# Patient Record
Sex: FEMALE | Race: WHITE | URBAN - METROPOLITAN AREA
[De-identification: names, ages, dates, MRNs, and addresses within clinical notes are randomized per-mention and may not be internally consistent; named-entity substitution may affect disease eponyms.]

---

## 2022-10-26 ENCOUNTER — OFFICE VISIT (OUTPATIENT)
Dept: ORTHOPEDIC SURGERY | Age: 57
End: 2022-10-26
Payer: COMMERCIAL

## 2022-10-26 VITALS — WEIGHT: 259 LBS | BODY MASS INDEX: 45.89 KG/M2 | HEIGHT: 63 IN

## 2022-10-26 DIAGNOSIS — S46.911A STRAIN OF RIGHT SHOULDER, INITIAL ENCOUNTER: Primary | ICD-10-CM

## 2022-10-26 DIAGNOSIS — S46.011A TRAUMATIC INCOMPLETE TEAR OF RIGHT ROTATOR CUFF, INITIAL ENCOUNTER: ICD-10-CM

## 2022-10-26 PROCEDURE — 99203 OFFICE O/P NEW LOW 30 MIN: CPT | Performed by: ORTHOPAEDIC SURGERY

## 2022-10-26 RX ORDER — PANTOPRAZOLE SODIUM 40 MG/1
TABLET, DELAYED RELEASE ORAL
COMMUNITY
Start: 2022-08-18

## 2022-10-26 RX ORDER — LEVOCETIRIZINE DIHYDROCHLORIDE 5 MG/1
TABLET, FILM COATED ORAL
COMMUNITY
Start: 2022-08-22

## 2022-10-26 RX ORDER — ACETAMINOPHEN 325 MG/1
650 TABLET ORAL EVERY 6 HOURS PRN
COMMUNITY
Start: 2022-06-15

## 2022-10-26 NOTE — LETTER
Physical Therapy Rehabilitation Referral    Patient Name:  Hardik Holm      YOB: 1965    Diagnosis:    1. Strain of right shoulder, initial encounter    2. Traumatic incomplete tear of right rotator cuff, initial encounter        Precautions:     [x] Evaluate and Treat    Post Op Instructions:  [] Continuous passive motion (CPM)  [] Elbow ROM  [x] Exercise in plane of scapula  []  Strengthening     [x] Pulley and instruction   [x] Home exercise program (copy to patient)   [] Sling when arm at risk  [] Sling or brace at all times   [] AAROM: Forward elevation to  140            [] AAROM: External rotation  To  40    [] Isometric external rotator strengthening [] AAROM: internal rotation: up the back  [x] Isometric abductor strengthening  [] AAROM: Internal abduction   [] Isometric internal rotator strengthening [] AAROM: cross-body adduction             Stretching:     Strengthening:  [x] Four quadrant (FE, ER, IR, CBA)  [x] Rotator cuff (ER, IR, Abd)  [x] Forward Elevation    [] External Rotators     [x] External Rotation    [] Internal Rotators  [x] Internal Rotation: up/back   [] Abductors     [x] Internal Rotation: supine in abduction  [x] Sleeper Stretch    [] Flexors  [x] Cross-body abduction    [] Extensors  [x] Pendulum (FE, Abd/Add, cw/ccw)  [x] Scapular Stabilizers   [x] Wall-walking (FE, Abd)        [x] Shoulder shrugs     [x] Table slides (FE)                [x] Rhomboid pinch  [] Elbow (flex, ext, pron, sup)        [x] Lat.  Pull downs     [] Medial epicondylitis program       [x] Forward punch   [] Lateral epicondylitis program       [x] Internal rotators     [] Progressive resistive exercises  [] Bench Press        [] Bench press plus  Activities:     [] Lateral pull-downs  [] Rowing     [x] Progressive two-hand supine press  [] Stepper/Exercise bike   [x] Biceps: curls/supination  [] Swimming  [] Water exercises    Modalities:     Return to Sport:  [x] Of Choice      [] Plyometrics  [] Ultrasound     [] Rhythmic stabilization  [] Iontophoresis    [] Core strengthening   [] Moist heat     [] Sports specific program:   [] Massage         [x] Cryotherapy      [] Electrical stimulation     [] Paraffin  [] Whirlpool  [] TENS    [x] Home exercise program (copy to patient). Perform exercises for:   15     minutes    3      times/day  [x] Supervised physical therapy  Frequency: []  1x week  [x] 2x week  [] 3x week  [] Other:   Duration: [] 2 weeks   [] 4 weeks  [x] 6 weeks  [] Other:     Additional Instructions:     Josafat Yuan MD, PhD

## 2022-10-26 NOTE — PROGRESS NOTES
[facility]  History and Physical  Shoulder Pain    Date:  10/27/2022    Name:  Kellee Gayle  Address:  [facility] [address] [address]    :  1965      Age:   56 y.o.    SSN:  [id number]      Medical Record Number:  [id number]    Reason for Visit:    Shoulder Pain [facility] NP RIGHT SHOULDER)      HPI:   Kellee Gayle is a 56 y.o. female who presents to our office today complaining of  right shoulder pain. She states she never had any problems with her right shoulder until she had a work related on 2022. She works for [employer]. 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 Since then she began having pain over the anterior shoulder. She tried to ice her shoulder that day but continue to have persistent symptoms and was more pain when driving home that evening. She went on to see an orthopedic at [facility] who was able to get radiographs and an MRI of her right shoulder. The right shoulder showed a partial supraspinatus tear. She has not had a chance to do any formal physical therapy. She has an allergy to ibuprofen and does not feel comfortable taking any oral NSAIDs. Pain over the anterior shoulder and down the posterior shoulder in the periscapular region. Her  with [employer] had recommended that she get a second opinion with Dr. John Aguilera. Of note the patient is [place],  and has 3 adult children. She is fluent in [place]. Pain Assessment  Location of Pain: Shoulder  Location Modifiers: Right  Severity of Pain: 5  Quality of Pain: Aching, Dull, Sharp, Throbbing  Duration of Pain: Persistent  Frequency of Pain: Constant  Aggravating Factors:  Other (Comment), Exercise, Straightening, Stretching  Limiting Behavior: Yes  Relieving Factors: Rest, Other (Comment), Nsaids  Result of Injury: Yes  Work-Related Injury: Yes  Are there other pain locations you wish to document?: No    Review of Systems:  A 14 point review of systems available in the scanned medical record as documented by the patient. The review is negative with the exception of those things mentioned in the History of Present Illness and Past Medical History. Past History:  No past medical history on file. No past surgical history on file. Current Outpatient Medications on File Prior to Visit   Medication Sig Dispense Refill    budesonide (PULMICORT FLEXHALER) 180 MCG/ACT AEPB inhaler Inhale 1 puff into the lungs 2 times daily      acetaminophen (TYLENOL) 325 MG tablet Take 650 mg by mouth every 6 hours as needed      levocetirizine (XYZAL) 5 MG tablet TAKE 2 TABLETS BY MOUTH AT BEDTIME      pantoprazole (PROTONIX) 40 MG tablet TAKE 1 TABLET BY MOUTH EVERY DAY       No current facility-administered medications on file prior to visit. Social History     Socioeconomic History    Marital status:      Spouse name: Not on file    Number of children: Not on file    Years of education: Not on file    Highest education level: Not on file   Occupational History    Not on file   Tobacco Use    Smoking status: Never    Smokeless tobacco: Never   Substance and Sexual Activity    Alcohol use: Not on file    Drug use: Not on file    Sexual activity: Not on file   Other Topics Concern    Not on file   Social History Narrative    Not on file     Social Determinants of Health     Financial Resource Strain: Not on file   Food Insecurity: Not on file   Transportation Needs: Not on file   Physical Activity: Not on file   Stress: Not on file   Social Connections: Not on file   Intimate Partner Violence: Not on file   Housing Stability: Not on file     No family history on file.     Current Medications:    Current Outpatient Medications   Medication Sig Dispense Refill    budesonide (PULMICORT FLEXHALER) 180 MCG/ACT AEPB inhaler Inhale 1 puff into the lungs 2 times daily      acetaminophen (TYLENOL) 325 MG tablet Take 650 mg by mouth every 6 hours as needed      levocetirizine (XYZAL) 5 MG tablet TAKE 2 TABLETS BY MOUTH AT BEDTIME      pantoprazole (PROTONIX) 40 MG tablet TAKE 1 TABLET BY MOUTH EVERY DAY       No current facility-administered medications for this visit. Allergies: Allergies   Allergen Reactions    Apple Itching     itching of skin ,lips,throat    Denis Hives     itching of skin ,lips,throat    Mangifera Indica Hives     itching of skin ,lips,throat    Strawberry Hives     itching of skin ,lips,throat       Physical Exam:  There were no vitals filed for this visit. General: Florence Coy is a healthy and well appearing 56 y.o. female who is sitting comfortably in our office in acute distress. General Exam:   Constitutional: Patient is adequately groomed with no evidence of malnutrition  DTRs: Deep tendon reflexes are intact  Mental Status: The patient is oriented to time, place and person. The patient's mood and affect are appropriate. Lymphatic: The lymphatic examination bilaterally reveals all areas to be without enlargement or induration. Vascular: Examination reveals no swelling or calf tenderness. Peripheral pulses are palpable and 2+. Neurological: The patient has good coordination. There is no weakness or sensory deficit. Neuro: alert. Oriented X 3  Eyes: Extra-ocular muscles intact  Mouth: Oral mucosa moist. No perioral lesions  Pulm: Respirations unlabored and regular. right Shoulder Exam:  Inspection:  No gross deformities, no signs of infection. Palpation:  There is no crepitus. Tenderness to palpation over the rotator cuff tear, periscapular pain. Non-tender to palpation over the L1. Active Range of Motion: good effort, no guarding, Forward elevation of 130, abduction of 90, external rotation with elbow at the side 40, internal rotation to the back is T10    Passive Range of Motion: Passively forward elevation can be further increased to 140.     Strength: External rotation with resistance with elbow at the side 4/5, internal rotation with resistance with elbow at the side +4/5, Champagne toast testing 4/5, Jobes test 4/5    Special Tests:   No Gavin muscle deformity. Neurovascular: Sensation to light touch is intact, no motor deficits, palpable radial pulses 2+    Additional Examinations:    Examination of the contralateral extremity does not show any tenderness, deformity or injury. Range of motion is unremarkable. There is no gross instability. There are no rashes, ulcerations or lesions. Strength and tone are normal.    Laboratory:  No visits with results within 14 Day(s) from this visit. Latest known visit with results is:   No results found for any previous visit. No results found for this or any previous visit (from the past 24 hour(s)). Radiographic:  X-rays of her shoulder was reviewed in the office. 6/15/2022  No acute fracture or dislocation. Glenohumeral joint appears maintained. Mild to moderate AC joint arthrosis. Soft tissues are unremarkable. Included lungs are clear    MRI of the right shoulder dated 8/10/2022 was reviewed independently and discussed with the patient. The MRI revealed: Tendinopathy and partial-thickness tearing of supraspinatus tendon footprint, subscapularis tendinopathy, intra-articular long head of biceps is not well visualized, severe acromioclavicular degenerative changes with inferior osteophytes and subacromial spurring. Self assessment questionnaires including ASES and Simple Shoulder Test were completed today. Assessment:  Tyler Ball is a 56 y.o. female who had a work related injury shoulder pain and weakness related to a right partial rotator cuff tear, rotator cuff tendinitis as well as a shoulder strain    Impression:  Encounter Diagnoses   Name Primary?     Strain of right shoulder, initial encounter Yes    Traumatic incomplete tear of right rotator cuff, initial encounter        Office Procedures:  Orders Placed This Encounter   Procedures    Amb External Referral To Physical Therapy     Referral Priority:   Routine     Referral Type:   Consult for Advice and Opinion     Referral Reason:   Patient Preference     Requested Specialty:   Orthopedic Physical Therapist     Number of Visits Requested:   1         Plan:  Pertinent imaging and examination findings were reviewed with Bethany Dubose. Today. The etiology, natural history, and treatment options for the disorder were discussed. The roles of activity modifications, medications, cryotherapy and heat, injections, physical therapy, and surgical interventions were all described to the patient and questions were answered. We recommend a trial of conservative treatment with a targeted physical therapy program.  Therapy orders were placed in the chart today. She may use topical creams like Voltaren gel and see if she is able to tolerate that without any allergies. She may also consider using oral turmeric capsules or curcumin as an anti-inflammatory agent since she is unable to take any oral NSAIDs due to her allergy. She may also consider using a Biofreeze type cream that is available over-the-counter as well. We can certainly consider a corticosteroid injection in the future if needed. We will keep her from working at this point until she does a trial of conservative treatment. Dwayne Huff will follow up in 4 weeks and/or as needed. They were in agreement with this plan and all questions were answered to the patient's satisfaction. They were encouraged to call with any questions. 10/27/2022  1:56 PM      Minnie Hilliard PA-C  Orthopaedic Sports Medicine Physician Assistant    During this examination, Minnie ALMARAZ PA-C, functioned as a scribe for Dr. Cinda Cardenas. This dictation was performed with a verbal recognition program (DRAGON) and it was checked for errors.   It is possible that there are still dictated errors within this office note. If so, please bring any errors to my attention for an addendum. All efforts were made to ensure that this office note is accurate.    ____________________  I, Dr. Albert Shannon, personally performed the services described in this documentation as described by Romana Bane, PA-C in my presence, and it is both accurate and complete. Josafat Cuevas MD, PhD  10/26/2022

## 2022-11-01 ENCOUNTER — HOSPITAL ENCOUNTER (OUTPATIENT)
Dept: PHYSICAL THERAPY | Age: 57
Setting detail: THERAPIES SERIES
Discharge: HOME | End: 2022-11-01
Payer: COMMERCIAL

## 2022-11-01 DIAGNOSIS — G89.29 CHRONIC RIGHT SHOULDER PAIN: Primary | ICD-10-CM

## 2022-11-01 DIAGNOSIS — M25.511 CHRONIC RIGHT SHOULDER PAIN: Primary | ICD-10-CM

## 2022-11-01 PROCEDURE — 97110 THERAPEUTIC EXERCISES: CPT

## 2022-11-01 PROCEDURE — 97162 PT EVAL MOD COMPLEX 30 MIN: CPT

## 2022-11-01 NOTE — FLOWSHEET NOTE
[facility]    Physical Therapy Treatment Note/ Progress Report:     Date:  2022    Patient Name:  Omaira Galvin    :  1965  MRN: [id number]  Medical Diagnosis:  Strain of unspecified muscle, fascia and tendon at shoulder and upper arm level, right arm, initial encounter [W57.014H]  Strain of muscle(s) and tendon(s) of the rotator cuff of right shoulder, initial encounter [S46.011A]  Treatment Diagnosis:    ICD-10-CM    1. Chronic right shoulder pain  M25.511     G89.29         Insurance/Certification information:  PT Insurance Information: Workers Compensation - 18 visits approved 10/28/22 - 22  Physician Information:  Britta Mast MD    Plan of care signed (Y/N): []  Yes [x]  No  Date sent:     Date of Patient follow up with Physician:      Progress Report: []  Yes  [x]  No     Functional Scale:           Date assessed:  FOTO physical FS primary measure score = 47; risk adjusted = 51  22    Date Range for reporting period:  Beginnin22  Ending:      Progress report due (10 Rx/or 30 days whichever is less):      Recertification due (POC duration/ or 90 days whichever is less): 22     Visit # Insurance Allowable Auth required?  Date Range   1 18 [x]  Yes  []  No 10/28/22 - 22     Pain level:  4-10/10     SUBJECTIVE:  See eval    OBJECTIVE: See eval  Observation:   Test measurements:      RESTRICTIONS/PRECAUTIONS: R shoulder pain    Exercises/Interventions:   Therapeutic Ex 15' Wt / Resistance Sets/sec Reps Notes          pendulums  1 10 CW, CCW   Scap retractions  1 10           Pt education 10'   Prognosis, diagnosis                                                                     Therapeutic Activities                                                                      Manual Intervention 5'       Shld /GH Mobs       Post Cap mobs       Thoracic/Rib manipulation       CT MT/Mobs       PROM MT 5' Poor tolerance - pt needs to be in reclined/elevated position with multiple pillows supported                 NMR re-education                                                                 Pt. Education:  -pt educated on diagnosis, prognosis and expectations for rehab  -all pt questions were answered    Home Exercise Program:  [address] access code [email or web address]      Therapeutic Exercise and NMR EXR  [x] (78071) Provided verbal/tactile cueing for activities related to strengthening, flexibility, endurance, ROM  for improvements in scapular, scapulothoracic and UE control with self care, reaching, carrying, lifting, house/yardwork, driving/computer work.    [] (18675) Provided verbal/tactile cueing for activities related to improving balance, coordination, kinesthetic sense, posture, motor skill, proprioception  to assist with  scapular, scapulothoracic and UE control with self care, reaching, carrying, lifting, house/yardwork, driving/computer work. Therapeutic Activities:    [] (02613 or 33337) Provided verbal/tactile cueing for activities related to improving balance, coordination, kinesthetic sense, posture, motor skill, proprioception and motor activation to allow for proper function of scapular, scapulothoracic and UE control with self care, carrying, lifting, driving/computer work.      Home Exercise Program:    [x] (71456) Reviewed/Progressed HEP activities related to strengthening, flexibility, endurance, ROM of scapular, scapulothoracic and UE control with self care, reaching, carrying, lifting, house/yardwork, driving/computer work  [] (67674) Reviewed/Progressed HEP activities related to improving balance, coordination, kinesthetic sense, posture, motor skill, proprioception of scapular, scapulothoracic and UE control with self care, reaching, carrying, lifting, house/yardwork, driving/computer work      Manual Treatments:  PROM / STM / Oscillations-Mobs:  G-I, II, III, IV (PA's, Inf., Post.)  [x] (38057) Provided manual therapy to mobilize soft tissue/joints of cervical/CT, scapular GHJ and UE for the purpose of modulating pain, promoting relaxation,  increasing ROM, reducing/eliminating soft tissue swelling/inflammation/restriction, improving soft tissue extensibility and allowing for proper ROM for normal function with self care, reaching, carrying, lifting, house/yardwork, driving/computer work    Modalities:  discussed use of ice    Charges:  Timed Code Treatment Minutes: 20   Total Treatment Minutes: 25       [] EVAL (LOW) 05942 (typically 20 minutes face-to-face)  [x] EVAL (MOD) 55793 (typically 30 minutes face-to-face)  [] EVAL (HIGH) 99915 (typically 45 minutes face-to-face)  [] RE-EVAL     [x] CC(49510) x  1   [] IONTO (36943)  [] NMR (25776) x     [] VASO (75945)  [] Manual (97691) x     [] Other:  [] TA (86265)x     [] Mech Traction (62086)  [] ES(attended) (86914)     [] ES (un) (98422): If BWC Please Indicate Time In/Out and Total Minutes  CPT Code Time in Time out Total Min                                              GOALS:  Patient stated goal: \"to get back to work\"  [] Progressing: [] Met: [] Not Met: [] Adjusted     Therapist goals for Patient:   Short Term Goals: To be achieved in: 2 weeks  1. Independent in HEP and progression per patient tolerance, in order to prevent re-injury. [] Progressing: [] Met: [] Not Met: [] Adjusted  2. Patient will have a decrease in pain to facilitate improvement in movement, function, and ADLs as indicated by Functional Deficits. [] Progressing: [] Met: [] Not Met: [] Adjusted     Long Term Goals: To be achieved in: 4-6 weeks  1. Patient will reach FOTO predicted score of at least 57 to assist with reaching prior level of function with activities such as reaching overhead. [] Progressing: [] Met: [] Not Met: [] Adjusted  2.  Patient will demonstrate increased AROM to [facility] for R shoulder to allow for proper joint functioning as indicated by patients Functional Deficits. [] Progressing: [] Met: [] Not Met: [] Adjusted  3. Patient will demonstrate an increase in Strength to 4/5 for R shoulder to allow for proper functional mobility as indicated by patients Functional Deficits. [] Progressing: [] Met: [] Not Met: [] Adjusted  4. Patient will return to reaching overhead and reaching behind her back without increased symptoms or restriction. [] Progressing: [] Met: [] Not Met: [] Adjusted  5. Patient will return to work full duty without restrictions. [] Progressing: [] Met: [] Not Met: [] Adjusted           ASSESSMENT:  See eval      Treatment/Activity Tolerance:  [] Patient tolerated treatment well [] Patient limited by fatique  [x] Patient limited by pain  [] Patient limited by other medical complications  [] Other:     Overall Progression Towards Functional goals/ Treatment Progress Update:  [] Patient is progressing as expected towards functional goals listed. [] Progression is slowed due to complexities/Impairments listed. [] Progression has been slowed due to co-morbidities.   [x] Plan just implemented, too soon to assess goals progression <30days   [] Goals require adjustment due to lack of progress  [] Patient is not progressing as expected and requires additional follow up with physician  [] Other    Return to Play: (if applicable)   []  Stage 1: Intro to Strength   []  Stage 2: Dynamic Strength and Intro to Plyometrics   []  Stage 3: Advanced Plyometrics and Intro to Throwing   []  Stage 4: Sport specific Training/Return to Sport     []  Ready to Return to Play, [employer]   []  Not Ready for Return to Sports   Comments:      Prognosis for POC: [] Good [x] Fair  [] Poor    Patient requires continued skilled intervention: [x] Yes  [] No      PLAN: See eval  [] Continue per plan of care [] Alter current plan (see comments)  [x] Plan of care initiated [] Hold pending MD visit [] Discharge    Electronically signed by: Marta Agosto PT Note: If patient does not return for scheduled/recommended follow up visits, this note will serve as a discharge from care along with the most recent update on progress.

## 2022-11-02 ENCOUNTER — TELEPHONE (OUTPATIENT)
Dept: ORTHOPEDIC SURGERY | Age: 57
End: 2022-11-02

## 2022-11-02 NOTE — TELEPHONE ENCOUNTER
WC  called to see if the claim is going to be Amended as only Sprain is allowed. Please send msg to [facility] with codes.

## 2022-11-03 ENCOUNTER — HOSPITAL ENCOUNTER (OUTPATIENT)
Dept: PHYSICAL THERAPY | Age: 57
Setting detail: THERAPIES SERIES
Discharge: HOME | End: 2022-11-03
Payer: COMMERCIAL

## 2022-11-03 PROCEDURE — 97140 MANUAL THERAPY 1/> REGIONS: CPT

## 2022-11-03 PROCEDURE — 97110 THERAPEUTIC EXERCISES: CPT

## 2022-11-03 PROCEDURE — 97016 VASOPNEUMATIC DEVICE THERAPY: CPT

## 2022-11-03 NOTE — FLOWSHEET NOTE
[facility] [employer]    Physical Therapy Treatment Note/ Progress Report:     Date:  2022    Patient Name:  Tyler Ball    :  1965  MRN: [id number]  Medical Diagnosis:  Strain of unspecified muscle, fascia and tendon at shoulder and upper arm level, right arm, initial encounter [S46.048D]  Strain of muscle(s) and tendon(s) of the rotator cuff of right shoulder, initial encounter [S46.011Z]  Treatment Diagnosis:  No diagnosis found. Insurance/Certification information:  PT Insurance Information: Workers Compensation - 18 visits approved 10/28/22 - 22  Physician Information:  Tonya Soto MD    Plan of care signed (Y/N): []  Yes [x]  No  Date sent:     Date of Patient follow up with Physician:      Progress Report: []  Yes  [x]  No     Functional Scale:           Date assessed:  FOTO physical FS primary measure score = 47; risk adjusted = 51  22    Date Range for reporting period:  Beginnin22  Ending:      Progress report due (10 Rx/or 30 days whichever is less): 60     Recertification due (POC duration/ or 90 days whichever is less): 22     Visit # Insurance Allowable Auth required? Date Range   2 18 [x]  Yes  []  No 10/28/22 - 22     Pain level:  4-10/10     SUBJECTIVE:  Pt reports that shoulder was sore after initial eval. Pt has been compliant with HEP. Pt let hot water run on her arm in shower and used biofreeze this morning, and shoulder feels better with this.     OBJECTIVE: See eval  Observation:   Test measurements:    11/3/22: R shoulder PROM: flex ~ 90 deg, ER ~20 deg    RESTRICTIONS/PRECAUTIONS: R shoulder pain    Exercises/Interventions:   Therapeutic Ex 20' Wt / Resistance Sets/sec Reps Notes          pendulums  1 10 CW, CCW   Scap retractions  1 10    Table slides w/use of slideboard  5\" 15    Seated cane ER AAROM  5\" 15    Gripping  Red putty 2' Therapeutic Activities                                                                      Manual Intervention 23'    Pt elevated in supine w/2 pillows   Shld /GH Mobs 8'      Post Cap mobs       Thoracic/Rib manipulation       CT MT/Mobs       PROM MT 15'                    NMR re-education                                                                 Pt. Education:  -pt educated on diagnosis, prognosis and expectations for rehab  -all pt questions were answered    Home Exercise Program:  Jose G Hand access code [email or web address]      Therapeutic Exercise and NMR EXR  [x] (54948) Provided verbal/tactile cueing for activities related to strengthening, flexibility, endurance, ROM  for improvements in scapular, scapulothoracic and UE control with self care, reaching, carrying, lifting, house/yardwork, driving/computer work.    [] (45055) Provided verbal/tactile cueing for activities related to improving balance, coordination, kinesthetic sense, posture, motor skill, proprioception  to assist with  scapular, scapulothoracic and UE control with self care, reaching, carrying, lifting, house/yardwork, driving/computer work. Therapeutic Activities:    [] (40522 or 86272) Provided verbal/tactile cueing for activities related to improving balance, coordination, kinesthetic sense, posture, motor skill, proprioception and motor activation to allow for proper function of scapular, scapulothoracic and UE control with self care, carrying, lifting, driving/computer work.      Home Exercise Program:    [x] (96767) Reviewed/Progressed HEP activities related to strengthening, flexibility, endurance, ROM of scapular, scapulothoracic and UE control with self care, reaching, carrying, lifting, house/yardwork, driving/computer work  [] (47853) Reviewed/Progressed HEP activities related to improving balance, coordination, kinesthetic sense, posture, motor skill, proprioception of scapular, scapulothoracic and UE control with self care, reaching, carrying, lifting, house/yardwork, driving/computer work      Manual Treatments:  PROM / STM / Oscillations-Mobs:  G-I, II, III, IV (PA's, Inf., Post.)  [x] (92757) Provided manual therapy to mobilize soft tissue/joints of cervical/CT, scapular GHJ and UE for the purpose of modulating pain, promoting relaxation,  increasing ROM, reducing/eliminating soft tissue swelling/inflammation/restriction, improving soft tissue extensibility and allowing for proper ROM for normal function with self care, reaching, carrying, lifting, house/yardwork, driving/computer work    Modalities:  15' vasopneumatic compression to decrease inflammation and muscle soreness    Charges:  Timed Code Treatment Minutes: 43   Total Treatment Minutes: 58       [] EVAL (LOW) 85360 (typically 20 minutes face-to-face)  [] EVAL (MOD) 48177 (typically 30 minutes face-to-face)  [] EVAL (HIGH) 89635 (typically 45 minutes face-to-face)  [] RE-EVAL     [x] MS(76013) x  1   [] IONTO (57976)  [] NMR (38704) x     [x] VASO (04338)  [x] Manual (32658) x  2   [] Other:  [] TA (27874)x     [] Mech Traction (17011)  [] ES(attended) (65276)     [] ES (un) (57557): If BWC Please Indicate Time In/Out and Total Minutes  CPT Code Time in Time out Total Min                                              GOALS:  Patient stated goal: \"to get back to work\"  [] Progressing: [] Met: [] Not Met: [] Adjusted     Therapist goals for Patient:   Short Term Goals: To be achieved in: 2 weeks  1. Independent in HEP and progression per patient tolerance, in order to prevent re-injury. [] Progressing: [] Met: [] Not Met: [] Adjusted  2. Patient will have a decrease in pain to facilitate improvement in movement, function, and ADLs as indicated by Functional Deficits. [] Progressing: [] Met: [] Not Met: [] Adjusted     Long Term Goals: To be achieved in: 4-6 weeks  1.  Patient will reach FOTO predicted score of at least 57 to assist with reaching prior level of function with activities such as reaching overhead. [] Progressing: [] Met: [] Not Met: [] Adjusted  2. Patient will demonstrate increased AROM to [facility] [place] for R shoulder to allow for proper joint functioning as indicated by patients Functional Deficits. [] Progressing: [] Met: [] Not Met: [] Adjusted  3. Patient will demonstrate an increase in Strength to 4/5 for R shoulder to allow for proper functional mobility as indicated by patients Functional Deficits. [] Progressing: [] Met: [] Not Met: [] Adjusted  4. Patient will return to reaching overhead and reaching behind her back without increased symptoms or restriction. [] Progressing: [] Met: [] Not Met: [] Adjusted  5. Patient will return to work full duty without restrictions. [] Progressing: [] Met: [] Not Met: [] Adjusted           ASSESSMENT:  Improved tolerance to shoulder PROM, but frequent verbal cues required to decrease muscle guarding. Added table slides, gripping and cane ER AAROM today. Treatment/Activity Tolerance:  [] Patient tolerated treatment well [] Patient limited by fatique  [x] Patient limited by pain  [] Patient limited by other medical complications  [] Other:     Overall Progression Towards Functional goals/ Treatment Progress Update:  [] Patient is progressing as expected towards functional goals listed. [] Progression is slowed due to complexities/Impairments listed. [] Progression has been slowed due to co-morbidities.   [x] Plan just implemented, too soon to assess goals progression <30days   [] Goals require adjustment due to lack of progress  [] Patient is not progressing as expected and requires additional follow up with physician  [] Other    Return to Play: (if applicable)   []  Stage 1: Intro to Strength   []  Stage 2: Dynamic Strength and Intro to Plyometrics   []  Stage 3: Advanced Plyometrics and Intro to Throwing   []  Stage 4: Sport specific Training/Return to Sport     []  Ready to Return to Play, [employer] All Above Stages   []  Not Ready for Return to Sports   Comments:      Prognosis for POC: [] Good [x] Fair  [] Poor    Patient requires continued skilled intervention: [x] Yes  [] No      PLAN: Decrease pain, improve shoulder AROM and strength. [x] Continue per plan of care [] Alter current plan (see comments)  [] Plan of care initiated [] Hold pending MD visit [] Discharge    Electronically signed by: Jaja Keating PT     Note: If patient does not return for scheduled/recommended follow up visits, this note will serve as a discharge from care along with the most recent update on progress.

## 2022-11-07 ENCOUNTER — TELEPHONE (OUTPATIENT)
Dept: ORTHOPEDIC SURGERY | Age: 57
End: 2022-11-07

## 2022-11-07 ENCOUNTER — HOSPITAL ENCOUNTER (OUTPATIENT)
Dept: PHYSICAL THERAPY | Age: 57
Setting detail: THERAPIES SERIES
Discharge: HOME | End: 2022-11-07
Payer: COMMERCIAL

## 2022-11-07 PROCEDURE — 97110 THERAPEUTIC EXERCISES: CPT

## 2022-11-07 PROCEDURE — 97140 MANUAL THERAPY 1/> REGIONS: CPT

## 2022-11-07 PROCEDURE — 97016 VASOPNEUMATIC DEVICE THERAPY: CPT

## 2022-11-07 NOTE — TELEPHONE ENCOUNTER
[id number] / WORK[employer]:    Stephane Jalloh    Phone#: [phone]    Fax#:    [employer] Date of Service:  10/26/2022    REASON FOR CALL:         [employer] Form Missing    Please fax to [address] [place] [address] when complete fx# [phone]

## 2022-11-07 NOTE — FLOWSHEET NOTE
[facility] [employer]    Physical Therapy Treatment Note/ Progress Report:     Date:  2022    Patient Name:  Danni Vee    :  1965  MRN: [id number]  Medical Diagnosis:  Strain of unspecified muscle, fascia and tendon at shoulder and upper arm level, right arm, initial encounter [S46.380H]  Strain of muscle(s) and tendon(s) of the rotator cuff of right shoulder, initial encounter [S46.011T]  Treatment Diagnosis:  No diagnosis found. Insurance/Certification information:  PT Insurance Information: Workers Compensation - 18 visits approved 10/28/22 - 22  Physician Information:  Audra Keane MD    Plan of care signed (Y/N): []  Yes [x]  No  Date sent:     Date of Patient follow up with Physician:      Progress Report: []  Yes  [x]  No     Functional Scale:           Date assessed:  TO physical FS primary measure score = 47; risk adjusted = 51  22    Date Range for reporting period:  Beginnin22  Ending:      Progress report due (10 Rx/or 30 days whichever is less):      Recertification due (POC duration/ or 90 days whichever is less): 22     Visit # Insurance Allowable Auth required? Date Range   3 18 [x]  Yes  []  No 10/28/22 - 22     Pain level:  4-10/10     SUBJECTIVE:  Pt reports that shoulder, as well as pec and scapula have continued to be pretty sore. Pt states that she did not take tylenol or use biofreeze this morning. OBJECTIVE: See eval  Observation:   Test measurements:    11/3/22: R shoulder PROM: flex ~ 90 deg, ER ~20 deg    RESTRICTIONS/PRECAUTIONS: R shoulder pain    Exercises/Interventions:   Therapeutic Ex 23' Wt / Resistance Sets/sec Reps Notes   pulleys    NPV?    pendulums  1 10 CW, CCW   Scap retractions  1 10    Table slides w/use of slideboard  5\" 15    Seated cane ER AAROM  5\" 15    Gripping  Red putty 2'     Supine cane press  1 10    Pec stretch gentle  10\" 5 Hands low Therapeutic Activities                                                                      Manual Intervention 20'    Pt elevated in supine w/2 pillows   Shld /GH Mobs 5'      Post Cap mobs       Thoracic/Rib manipulation       CT MT/Mobs       PROM MT 15'                    NMR re-education                                                                 Pt. Education:  -pt educated on diagnosis, prognosis and expectations for rehab  -all pt questions were answered    Home Exercise Program:  Noel Jones access code [email or web address]      Therapeutic Exercise and NMR EXR  [x] (68512) Provided verbal/tactile cueing for activities related to strengthening, flexibility, endurance, ROM  for improvements in scapular, scapulothoracic and UE control with self care, reaching, carrying, lifting, house/yardwork, driving/computer work.    [] (41773) Provided verbal/tactile cueing for activities related to improving balance, coordination, kinesthetic sense, posture, motor skill, proprioception  to assist with  scapular, scapulothoracic and UE control with self care, reaching, carrying, lifting, house/yardwork, driving/computer work. Therapeutic Activities:    [] (68277 or 76111) Provided verbal/tactile cueing for activities related to improving balance, coordination, kinesthetic sense, posture, motor skill, proprioception and motor activation to allow for proper function of scapular, scapulothoracic and UE control with self care, carrying, lifting, driving/computer work.      Home Exercise Program:    [x] (12591) Reviewed/Progressed HEP activities related to strengthening, flexibility, endurance, ROM of scapular, scapulothoracic and UE control with self care, reaching, carrying, lifting, house/yardwork, driving/computer work  [] (57456) Reviewed/Progressed HEP activities related to improving balance, coordination, kinesthetic sense, posture, motor skill, proprioception of scapular, scapulothoracic and UE control with self care, reaching, carrying, lifting, house/yardwork, driving/computer work      Manual Treatments:  PROM / STM / Oscillations-Mobs:  G-I, II, III, IV (Sofya, Inf., Post.)  [x] (34753) Provided manual therapy to mobilize soft tissue/joints of cervical/CT, scapular GHJ and UE for the purpose of modulating pain, promoting relaxation,  increasing ROM, reducing/eliminating soft tissue swelling/inflammation/restriction, improving soft tissue extensibility and allowing for proper ROM for normal function with self care, reaching, carrying, lifting, house/yardwork, driving/computer work    Modalities:  15' vasopneumatic compression to decrease inflammation and muscle soreness    Charges:  Timed Code Treatment Minutes: 43   Total Treatment Minutes: 58       [] EVAL (LOW) 45487 (typically 20 minutes face-to-face)  [] EVAL (MOD) 53822 (typically 30 minutes face-to-face)  [] EVAL (HIGH) 06754 (typically 45 minutes face-to-face)  [] RE-EVAL     [x] DG(75515) x  2   [] IONTO (86583)  [] NMR (28859) x     [x] VASO (74877)  [x] Manual (58906) x  1   [] Other:  [] TA (10793)x     [] Mech Traction (75101)  [] ES(attended) (03874)     [] ES (un) (48717): If BWC Please Indicate Time In/Out and Total Minutes  CPT Code Time in Time out Total Min                                              GOALS:  Patient stated goal: \"to get back to work\"  [] Progressing: [] Met: [] Not Met: [] Adjusted     Therapist goals for Patient:   Short Term Goals: To be achieved in: 2 weeks  1. Independent in HEP and progression per patient tolerance, in order to prevent re-injury. [] Progressing: [] Met: [] Not Met: [] Adjusted  2. Patient will have a decrease in pain to facilitate improvement in movement, function, and ADLs as indicated by Functional Deficits. [] Progressing: [] Met: [] Not Met: [] Adjusted     Long Term Goals: To be achieved in: 4-6 weeks  1.  Patient will reach FOTO predicted score of at least 57 to assist with reaching prior level of function with activities such as reaching overhead. [] Progressing: [] Met: [] Not Met: [] Adjusted  2. Patient will demonstrate increased AROM to [facility] [place] for R shoulder to allow for proper joint functioning as indicated by patients Functional Deficits. [] Progressing: [] Met: [] Not Met: [] Adjusted  3. Patient will demonstrate an increase in Strength to 4/5 for R shoulder to allow for proper functional mobility as indicated by patients Functional Deficits. [] Progressing: [] Met: [] Not Met: [] Adjusted  4. Patient will return to reaching overhead and reaching behind her back without increased symptoms or restriction. [] Progressing: [] Met: [] Not Met: [] Adjusted  5. Patient will return to work full duty without restrictions. [] Progressing: [] Met: [] Not Met: [] Adjusted           ASSESSMENT:  Improved tolerance to shoulder PROM, but frequent verbal cues required to decrease muscle guarding. Added supine cane press and gentle pec stretch at doorway with hands low with fatigue noted. Treatment/Activity Tolerance:  [] Patient tolerated treatment well [] Patient limited by fatique  [x] Patient limited by pain  [] Patient limited by other medical complications  [] Other:     Overall Progression Towards Functional goals/ Treatment Progress Update:  [] Patient is progressing as expected towards functional goals listed. [] Progression is slowed due to complexities/Impairments listed. [] Progression has been slowed due to co-morbidities.   [x] Plan just implemented, too soon to assess goals progression <30days   [] Goals require adjustment due to lack of progress  [] Patient is not progressing as expected and requires additional follow up with physician  [] Other    Return to Play: (if applicable)   []  Stage 1: Intro to Strength   []  Stage 2: Dynamic Strength and Intro to Plyometrics   []  Stage 3: Advanced Plyometrics and Intro to Throwing   []  Stage 4: Sport specific Training/Return to Sport     []  Ready to Return to Play, Meets All Above Stages   []  Not Ready for Return to Sports   Comments:      Prognosis for POC: [] Good [x] Fair  [] Poor    Patient requires continued skilled intervention: [x] Yes  [] No      PLAN: Decrease pain, improve shoulder AROM and strength. [x] Continue per plan of care [] Alter current plan (see comments)  [] Plan of care initiated [] Hold pending MD visit [] Discharge    Electronically signed by: Sandi Troncoso PT     Note: If patient does not return for scheduled/recommended follow up visits, this note will serve as a discharge from care along with the most recent update on progress.

## 2022-11-09 ENCOUNTER — HOSPITAL ENCOUNTER (OUTPATIENT)
Dept: PHYSICAL THERAPY | Age: 57
Setting detail: THERAPIES SERIES
Discharge: HOME | End: 2022-11-09
Payer: COMMERCIAL

## 2022-11-09 PROCEDURE — 97140 MANUAL THERAPY 1/> REGIONS: CPT

## 2022-11-09 PROCEDURE — 97110 THERAPEUTIC EXERCISES: CPT

## 2022-11-09 PROCEDURE — 97016 VASOPNEUMATIC DEVICE THERAPY: CPT

## 2022-11-09 NOTE — FLOWSHEET NOTE
[facility]    Physical Therapy Treatment Note/ Progress Report:     Date:  2022    Patient Name:  Kameron Peterson    :  1965  MRN: [id number]  Medical Diagnosis:  Strain of unspecified muscle, fascia and tendon at shoulder and upper arm level, right arm, initial encounter [S46.819C]  Strain of muscle(s) and tendon(s) of the rotator cuff of right shoulder, initial encounter [S46.011A]  Treatment Diagnosis:  No diagnosis found. Insurance/Certification information:  PT Insurance Information: Workers Compensation - 18 visits approved 10/28/22 - 22  Physician Information:  Evan Woodard MD    Plan of care signed (Y/N): []  Yes [x]  No  Date sent:     Date of Patient follow up with Physician:      Progress Report: []  Yes  [x]  No     Functional Scale:           Date assessed:  FOTO physical FS primary measure score = 47; risk adjusted = 51  22    Date Range for reporting period:  Beginnin22  Ending:      Progress report due (10 Rx/or 30 days whichever is less):      Recertification due (POC duration/ or 90 days whichever is less): 22     Visit # Insurance Allowable Auth required? Date Range   4 18 [x]  Yes  []  No 10/28/22 - 22     Pain level:  4-10/10     SUBJECTIVE:  Pt reports that shoulder, pec and scapula continue to be sore. Pt states that she occasionally will wake up having rolled onto her R shoulder and has some N/T in her R hand. OBJECTIVE: See eval  Observation:   Test measurements:    11/3/22: R shoulder PROM: flex ~ 90 deg, ER ~20 deg    RESTRICTIONS/PRECAUTIONS: R shoulder pain    Exercises/Interventions:   Therapeutic Ex 25' Wt / Resistance Sets/sec Reps Notes   pulleys    NPV?    pendulums  1 10 CW, CCW   Scap retractions  1 10    Table slides w/use of slideboard  5\" 15    Seated cane ER AAROM  5\" 15    Gripping  Red putty 2'     Supine cane press  1 10    Pec stretch gentle  10\" 5 Hands low   TB rows red 2 10 Performed in sitting due to LBP   TB ext red 2 10 Performed in sitting due to LBP                                  Therapeutic Activities                                                                      Manual Intervention 15'    Pt elevated in supine w/2 pillows   Shld /GH Mobs       Post Cap mobs       Thoracic/Rib manipulation       STM 5'   R pec, UT   PROM MT 10'                    NMR re-education                                                                 Pt. Education:  -pt educated on diagnosis, prognosis and expectations for rehab  -all pt questions were answered    Home Exercise Program:  Neeraj Evangelista access code [email or web address]      Therapeutic Exercise and NMR EXR  [x] (47988) Provided verbal/tactile cueing for activities related to strengthening, flexibility, endurance, ROM  for improvements in scapular, scapulothoracic and UE control with self care, reaching, carrying, lifting, house/yardwork, driving/computer work.    [] (66752) Provided verbal/tactile cueing for activities related to improving balance, coordination, kinesthetic sense, posture, motor skill, proprioception  to assist with  scapular, scapulothoracic and UE control with self care, reaching, carrying, lifting, house/yardwork, driving/computer work. Therapeutic Activities:    [] (37798 or 52979) Provided verbal/tactile cueing for activities related to improving balance, coordination, kinesthetic sense, posture, motor skill, proprioception and motor activation to allow for proper function of scapular, scapulothoracic and UE control with self care, carrying, lifting, driving/computer work.      Home Exercise Program:    [x] (11760) Reviewed/Progressed HEP activities related to strengthening, flexibility, endurance, ROM of scapular, scapulothoracic and UE control with self care, reaching, carrying, lifting, house/yardwork, driving/computer work  [] (32532) Reviewed/Progressed HEP activities related to improving balance, coordination, kinesthetic sense, posture, motor skill, proprioception of scapular, scapulothoracic and UE control with self care, reaching, carrying, lifting, house/yardwork, driving/computer work      Manual Treatments:  PROM / STM / Oscillations-Mobs:  G-I, II, III, IV (PA's, Inf., Post.)  [x] (43311) Provided manual therapy to mobilize soft tissue/joints of cervical/CT, scapular GHJ and UE for the purpose of modulating pain, promoting relaxation,  increasing ROM, reducing/eliminating soft tissue swelling/inflammation/restriction, improving soft tissue extensibility and allowing for proper ROM for normal function with self care, reaching, carrying, lifting, house/yardwork, driving/computer work    Modalities:  15' vasopneumatic compression to decrease inflammation and muscle soreness    Charges:  Timed Code Treatment Minutes: 40   Total Treatment Minutes: 55       [] EVAL (LOW) 45124 (typically 20 minutes face-to-face)  [] EVAL (MOD) 50990 (typically 30 minutes face-to-face)  [] EVAL (HIGH) 36502 (typically 45 minutes face-to-face)  [] RE-EVAL     [x] HR(15511) x  2   [] IONTO (94580)  [] NMR (93667) x     [x] VASO (84907)  [x] Manual (72834) x  1   [] Other:  [] TA (69147)x     [] Mech Traction (16733)  [] ES(attended) (56554)     [] ES (un) (42009): If BWC Please Indicate Time In/Out and Total Minutes  CPT Code Time in Time out Total Min                                              GOALS:  Patient stated goal: \"to get back to work\"  [] Progressing: [] Met: [] Not Met: [] Adjusted     Therapist goals for Patient:   Short Term Goals: To be achieved in: 2 weeks  1. Independent in HEP and progression per patient tolerance, in order to prevent re-injury. [] Progressing: [] Met: [] Not Met: [] Adjusted  2. Patient will have a decrease in pain to facilitate improvement in movement, function, and ADLs as indicated by Functional Deficits.   [] Progressing: [] Met: [] Not Met: [] Adjusted     Long Term Goals: To be achieved in: 4-6 weeks  1. Patient will reach FOTO predicted score of at least 57 to assist with reaching prior level of function with activities such as reaching overhead. [] Progressing: [] Met: [] Not Met: [] Adjusted  2. Patient will demonstrate increased AROM to [facility] [place] for R shoulder to allow for proper joint functioning as indicated by patients Functional Deficits. [] Progressing: [] Met: [] Not Met: [] Adjusted  3. Patient will demonstrate an increase in Strength to 4/5 for R shoulder to allow for proper functional mobility as indicated by patients Functional Deficits. [] Progressing: [] Met: [] Not Met: [] Adjusted  4. Patient will return to reaching overhead and reaching behind her back without increased symptoms or restriction. [] Progressing: [] Met: [] Not Met: [] Adjusted  5. Patient will return to work full duty without restrictions. [] Progressing: [] Met: [] Not Met: [] Adjusted           ASSESSMENT:  Pt demonstrates improved shoulder PROM in all directions, but is still limited by pain. Mod TTP along pec major, pec minor and UT. Added TB rows and TB ext for periscapular strengthening with cues required for appropriate form. Treatment/Activity Tolerance:  [] Patient tolerated treatment well [] Patient limited by fatique  [x] Patient limited by pain  [] Patient limited by other medical complications  [] Other:     Overall Progression Towards Functional goals/ Treatment Progress Update:  [] Patient is progressing as expected towards functional goals listed. [] Progression is slowed due to complexities/Impairments listed. [] Progression has been slowed due to co-morbidities.   [x] Plan just implemented, too soon to assess goals progression <30days   [] Goals require adjustment due to lack of progress  [] Patient is not progressing as expected and requires additional follow up with physician  [] Other    Return to Play: (if applicable)   []  Stage 1: Intro to Strength   [] Stage 2: Dynamic Strength and Intro to Plyometrics   []  Stage 3: Advanced Plyometrics and Intro to Throwing   []  Stage 4: Sport specific Training/Return to Sport     []  Ready to Return to Play, Meets All Above Stages   []  Not Ready for Return to Sports   Comments:      Prognosis for POC: [] Good [x] Fair  [] Poor    Patient requires continued skilled intervention: [x] Yes  [] No      PLAN: Decrease pain, improve shoulder AROM and strength. [x] Continue per plan of care [] Alter current plan (see comments)  [] Plan of care initiated [] Hold pending MD visit [] Discharge    Electronically signed by: Pau Waite PT     Note: If patient does not return for scheduled/recommended follow up visits, this note will serve as a discharge from care along with the most recent update on progress.

## 2022-11-15 ENCOUNTER — HOSPITAL ENCOUNTER (OUTPATIENT)
Dept: PHYSICAL THERAPY | Age: 57
Setting detail: THERAPIES SERIES
Discharge: HOME | End: 2022-11-15
Payer: COMMERCIAL

## 2022-11-15 NOTE — FLOWSHEET NOTE
[facility]    Physical Therapy  Cancellation/No-show Note  Patient Name:  Omaira Galvin  :  1965   Date:  11/15/2022  Cancelled visits to date: 1  No-shows to date: 0    For today's appointment patient:  [x]  Cancelled  []  Rescheduled appointment  []  No-show     Reason given by patient:  []  Patient ill  []  Conflicting appointment  []  No transportation    []  Conflict with work  []  No reason given  [x]  Other:  Pt woke up today feeling dizzy   Comments:      Phone call information:   []  Phone call made today to patient at _ time at number provided:      []  Patient answered, conversation as follows:    []  Patient did not answer, message left as follows:  []  Phone call not made today  [x]  Phone call not needed - pt contacted us to cancel and provided reason for cancellation.      Electronically signed by:  Hyacinth Ha, PT, DPT

## 2022-11-17 ENCOUNTER — HOSPITAL ENCOUNTER (OUTPATIENT)
Dept: PHYSICAL THERAPY | Age: 57
Setting detail: THERAPIES SERIES
Discharge: HOME | End: 2022-11-17
Payer: COMMERCIAL

## 2022-11-17 ENCOUNTER — TELEPHONE (OUTPATIENT)
Dept: ORTHOPEDIC SURGERY | Age: 57
End: 2022-11-17

## 2022-11-17 PROCEDURE — 97110 THERAPEUTIC EXERCISES: CPT

## 2022-11-17 PROCEDURE — 97140 MANUAL THERAPY 1/> REGIONS: CPT

## 2022-11-17 NOTE — TELEPHONE ENCOUNTER
Other PATIENT'S DAUGHTER CALLING PER PHYSICAL THERAPIST REQUEST PATIENT NEEDS AN EXTENSION FOR WORK THEY ARE REQUESTING A WORKI NOTE. EMAIL: [email or web address] MAY HAVE TO CONFIRM EMAIL WAS READ BACK BUT GOT CHANGES. Calderon Davies [phone]

## 2022-11-17 NOTE — FLOWSHEET NOTE
[facility]    Physical Therapy Treatment Note/ Progress Report:     Date:  2022    Patient Name:  Florence Coy    :  1965  MRN: [id number]  Medical Diagnosis:  Strain of unspecified muscle, fascia and tendon at shoulder and upper arm level, right arm, initial encounter [S46.193F]  Strain of muscle(s) and tendon(s) of the rotator cuff of right shoulder, initial encounter [S46.011A]  Treatment Diagnosis:  No diagnosis found. Insurance/Certification information:  PT Insurance Information: Workers Compensation - 18 visits approved 10/28/22 - 22  Physician Information:  Sudha Carrillo MD    Plan of care signed (Y/N): []  Yes [x]  No  Date sent:     Date of Patient follow up with Physician:      Progress Report: []  Yes  [x]  No     Functional Scale:           Date assessed:  TO physical FS primary measure score = 47; risk adjusted = 51  22    Date Range for reporting period:  Beginnin22  Ending:      Progress report due (10 Rx/or 30 days whichever is less): 15/0/02     Recertification due (POC duration/ or 90 days whichever is less): 22     Visit # Insurance Allowable Auth required? Date Range   5 18 [x]  Yes  []  No 10/28/22 - 22     Pain level:  4-10/10     SUBJECTIVE:  Pt reports that shoulder, pec and scapula continue to be sore.      OBJECTIVE: See eval  Observation:   Test measurements:    11/3/22: R shoulder PROM: flex ~ 90 deg, ER ~20 deg    RESTRICTIONS/PRECAUTIONS: R shoulder pain    Exercises/Interventions:   Therapeutic Ex 25' Wt / Resistance Sets/sec Reps Notes          pendulums  1 10 CW, CCW   Scap retractions  1 10    Table slides w/use of slideboard  5\" 15    Seated cane ER AAROM  5\" 15    Gripping     Supine cane press  1 10    Pec stretch gentle  10\" 5 Hands low   TB rows red 2 10 Performed in sitting due to LBP   TB ext red 2 10 Performed in sitting due to LBP   Shoulder IR/ER submax iso's 5\" 10x ea Performed in sitting due to LBP                           Therapeutic Activities                                                                      Manual Intervention 15'    Pt elevated in supine w/2 pillows   Shld /GH Mobs       Post Cap mobs       Thoracic/Rib manipulation       STM 5'   R pec, UT   PROM MT 10'                    NMR re-education                                                                 Pt. Education:  -pt educated on diagnosis, prognosis and expectations for rehab  -all pt questions were answered    Home Exercise Program:  [address] access code [email or web address]      Therapeutic Exercise and NMR EXR  [x] (93593) Provided verbal/tactile cueing for activities related to strengthening, flexibility, endurance, ROM  for improvements in scapular, scapulothoracic and UE control with self care, reaching, carrying, lifting, house/yardwork, driving/computer work.    [] (91112) Provided verbal/tactile cueing for activities related to improving balance, coordination, kinesthetic sense, posture, motor skill, proprioception  to assist with  scapular, scapulothoracic and UE control with self care, reaching, carrying, lifting, house/yardwork, driving/computer work. Therapeutic Activities:    [] (65192 or 19869) Provided verbal/tactile cueing for activities related to improving balance, coordination, kinesthetic sense, posture, motor skill, proprioception and motor activation to allow for proper function of scapular, scapulothoracic and UE control with self care, carrying, lifting, driving/computer work.      Home Exercise Program:    [x] (49189) Reviewed/Progressed HEP activities related to strengthening, flexibility, endurance, ROM of scapular, scapulothoracic and UE control with self care, reaching, carrying, lifting, house/yardwork, driving/computer work  [] (47773) Reviewed/Progressed HEP activities related to improving balance, coordination, kinesthetic sense, posture, motor skill, proprioception of scapular, scapulothoracic and UE control with self care, reaching, carrying, lifting, house/yardwork, driving/computer work      Manual Treatments:  PROM / STM / Oscillations-Mobs:  G-I, II, III, IV (Sofya, Inf., Post.)  [x] (58517) Provided manual therapy to mobilize soft tissue/joints of cervical/CT, scapular GHJ and UE for the purpose of modulating pain, promoting relaxation,  increasing ROM, reducing/eliminating soft tissue swelling/inflammation/restriction, improving soft tissue extensibility and allowing for proper ROM for normal function with self care, reaching, carrying, lifting, house/yardwork, driving/computer work    Modalities:    Charges:  Timed Code Treatment Minutes: 40   Total Treatment Minutes: 40       [] EVAL (LOW) 81828 (typically 20 minutes face-to-face)  [] EVAL (MOD) 15209 (typically 30 minutes face-to-face)  [] EVAL (HIGH) 60767 (typically 45 minutes face-to-face)  [] RE-EVAL     [x] LK(47009) x  2   [] IONTO (23203)  [] NMR (12304) x     [] VASO (24835)  [x] Manual (92930) x  1   [] Other:  [] TA (19163)x     [] Mech Traction (04046)  [] ES(attended) (35977)     [] ES (un) (58964): If BWC Please Indicate Time In/Out and Total Minutes  CPT Code Time in Time out Total Min                                              GOALS:  Patient stated goal: \"to get back to work\"  [] Progressing: [] Met: [] Not Met: [] Adjusted     Therapist goals for Patient:   Short Term Goals: To be achieved in: 2 weeks  1. Independent in HEP and progression per patient tolerance, in order to prevent re-injury. [] Progressing: [] Met: [] Not Met: [] Adjusted  2. Patient will have a decrease in pain to facilitate improvement in movement, function, and ADLs as indicated by Functional Deficits. [] Progressing: [] Met: [] Not Met: [] Adjusted     Long Term Goals: To be achieved in: 4-6 weeks  1.  Patient will reach FOTO predicted score of at least 57 to assist with reaching prior level of function with activities such as reaching overhead. [] Progressing: [] Met: [] Not Met: [] Adjusted  2. Patient will demonstrate increased AROM to [facility] [place] for R shoulder to allow for proper joint functioning as indicated by patients Functional Deficits. [] Progressing: [] Met: [] Not Met: [] Adjusted  3. Patient will demonstrate an increase in Strength to 4/5 for R shoulder to allow for proper functional mobility as indicated by patients Functional Deficits. [] Progressing: [] Met: [] Not Met: [] Adjusted  4. Patient will return to reaching overhead and reaching behind her back without increased symptoms or restriction. [] Progressing: [] Met: [] Not Met: [] Adjusted  5. Patient will return to work full duty without restrictions. [] Progressing: [] Met: [] Not Met: [] Adjusted           ASSESSMENT:  Pt demonstrates improved shoulder PROM in all directions, but is still limited by pain in all directions. Added shoulder IR/ER submax iso's for RTC strengthening with quite a bit of fatigue noted. Frequent verbal and tactile cues required to decrease compensation and for appropriate form with exercises. Treatment/Activity Tolerance:  [] Patient tolerated treatment well [] Patient limited by fatique  [x] Patient limited by pain  [] Patient limited by other medical complications  [] Other:     Overall Progression Towards Functional goals/ Treatment Progress Update:  [] Patient is progressing as expected towards functional goals listed. [] Progression is slowed due to complexities/Impairments listed. [] Progression has been slowed due to co-morbidities.   [x] Plan just implemented, too soon to assess goals progression <30days   [] Goals require adjustment due to lack of progress  [] Patient is not progressing as expected and requires additional follow up with physician  [] Other    Return to Play: (if applicable)   []  Stage 1: Intro to Strength   []  Stage 2: Dynamic Strength and Intro to Plyometrics   []  Stage 3: Advanced Plyometrics and Intro to Throwing   []  Stage 4: Sport specific Training/Return to Sport     []  Ready to Return to Play, Meets All Above Stages   []  Not Ready for Return to Sports   Comments:      Prognosis for POC: [] Good [x] Fair  [] Poor    Patient requires continued skilled intervention: [x] Yes  [] No      PLAN: Decrease pain, improve shoulder AROM and strength. [x] Continue per plan of care [] Alter current plan (see comments)  [] Plan of care initiated [] Hold pending MD visit [] Discharge    Electronically signed by: Yojana Rader PT     Note: If patient does not return for scheduled/recommended follow up visits, this note will serve as a discharge from care along with the most recent update on progress.

## 2022-11-21 ENCOUNTER — TELEPHONE (OUTPATIENT)
Dept: ORTHOPEDIC SURGERY | Age: 57
End: 2022-11-21

## 2022-11-22 ENCOUNTER — HOSPITAL ENCOUNTER (OUTPATIENT)
Dept: PHYSICAL THERAPY | Age: 57
Setting detail: THERAPIES SERIES
Discharge: HOME | End: 2022-11-22
Payer: COMMERCIAL

## 2022-11-22 PROCEDURE — 97110 THERAPEUTIC EXERCISES: CPT

## 2022-11-22 PROCEDURE — 97140 MANUAL THERAPY 1/> REGIONS: CPT

## 2022-11-22 NOTE — FLOWSHEET NOTE
[employer]    Physical Therapy Treatment Note/ Progress Report:     Date:  2022    Patient Name:  Rudy Jimenez    :  1965  MRN: [id number]  Medical Diagnosis:  Strain of unspecified muscle, fascia and tendon at shoulder and upper arm level, right arm, initial encounter [S46.079L]  Strain of muscle(s) and tendon(s) of the rotator cuff of right shoulder, initial encounter [S46.011A]  Treatment Diagnosis:  No diagnosis found. Insurance/Certification information:  PT Insurance Information: Workers Compensation - 18 visits approved 10/28/22 - 22  Physician Information:  Katie Ga MD    Plan of care signed (Y/N): []  Yes [x]  No  Date sent:     Date of Patient follow up with Physician:      Progress Report: []  Yes  [x]  No     Functional Scale:           Date assessed:  FOTO physical FS primary measure score = 47; risk adjusted = 51  22    Date Range for reporting period:  Beginnin22  Ending:      Progress report due (10 Rx/or 30 days whichever is less):      Recertification due (POC duration/ or 90 days whichever is less): 22     Visit # Insurance Allowable Auth required? Date Range   6 18 [x]  Yes  []  No 10/28/22 - 22     Pain level:  4-10/10     SUBJECTIVE:  Pt reports that shoulder, pec and scapula continue to be sore.      OBJECTIVE: See eval  Observation:   Test measurements:    11/3/22: R shoulder PROM: flex ~ 90 deg, ER ~20 deg    RESTRICTIONS/PRECAUTIONS: R shoulder pain    Exercises/Interventions:   Therapeutic Ex 25' Wt / Resistance Sets/sec Reps Notes          Scap retractions  1 10    Table slides w/use of slideboard  5\" 15    Seated cane ER AAROM  HEP   Supine cane press  2 10    Pec stretch gentle  10\" 5 Hands low   TB rows red 2 10 Performed in sitting due to LBP   TB ext red 2 10 Performed in sitting due to LBP   Shoulder IR/ER submax iso's   5\" 10x ea Performed in sitting due to LBP Supine cervical retractions  5\" 10    No monies yellow 1 10              Therapeutic Activities                                                                      Manual Intervention 20'    Pt elevated in supine w/2 pillows   Shld /GH Mobs       Post Cap mobs       Thoracic/Rib manipulation       STM 5'   R pec, UT, teres minor   PROM MT 10'      Manual cervical traction 5'             NMR re-education                                                                 Pt. Education:  -pt educated on diagnosis, prognosis and expectations for rehab  -all pt questions were answered    Home Exercise Program:  [address] access code [email or web address]      Therapeutic Exercise and NMR EXR  [x] (44212) Provided verbal/tactile cueing for activities related to strengthening, flexibility, endurance, ROM  for improvements in scapular, scapulothoracic and UE control with self care, reaching, carrying, lifting, house/yardwork, driving/computer work.    [] (37666) Provided verbal/tactile cueing for activities related to improving balance, coordination, kinesthetic sense, posture, motor skill, proprioception  to assist with  scapular, scapulothoracic and UE control with self care, reaching, carrying, lifting, house/yardwork, driving/computer work. Therapeutic Activities:    [] (83358 or 02787) Provided verbal/tactile cueing for activities related to improving balance, coordination, kinesthetic sense, posture, motor skill, proprioception and motor activation to allow for proper function of scapular, scapulothoracic and UE control with self care, carrying, lifting, driving/computer work.      Home Exercise Program:    [x] (11219) Reviewed/Progressed HEP activities related to strengthening, flexibility, endurance, ROM of scapular, scapulothoracic and UE control with self care, reaching, carrying, lifting, house/yardwork, driving/computer work  [] (30172) Reviewed/Progressed HEP activities related to improving balance, coordination, kinesthetic sense, posture, motor skill, proprioception of scapular, scapulothoracic and UE control with self care, reaching, carrying, lifting, house/yardwork, driving/computer work      Manual Treatments:  PROM / STM / Oscillations-Mobs:  G-I, II, III, IV (PA's, Inf., Post.)  [x] (97186) Provided manual therapy to mobilize soft tissue/joints of cervical/CT, scapular GHJ and UE for the purpose of modulating pain, promoting relaxation,  increasing ROM, reducing/eliminating soft tissue swelling/inflammation/restriction, improving soft tissue extensibility and allowing for proper ROM for normal function with self care, reaching, carrying, lifting, house/yardwork, driving/computer work    Modalities:    Charges:  Timed Code Treatment Minutes: 45   Total Treatment Minutes: 45       [] EVAL (LOW) 36031 (typically 20 minutes face-to-face)  [] EVAL (MOD) 30345 (typically 30 minutes face-to-face)  [] EVAL (HIGH) 40439 (typically 45 minutes face-to-face)  [] RE-EVAL     [x] AM(89063) x  2   [] IONTO (37268)  [] NMR (93123) x     [] VASO (30874)  [x] Manual (88597) x  1   [] Other:  [] TA (48940)x     [] Mech Traction (19272)  [] ES(attended) (96357)     [] ES (un) (07710): If BWC Please Indicate Time In/Out and Total Minutes  CPT Code Time in Time out Total Min                                              GOALS:  Patient stated goal: \"to get back to work\"  [] Progressing: [] Met: [] Not Met: [] Adjusted     Therapist goals for Patient:   Short Term Goals: To be achieved in: 2 weeks  1. Independent in HEP and progression per patient tolerance, in order to prevent re-injury. [] Progressing: [] Met: [] Not Met: [] Adjusted  2. Patient will have a decrease in pain to facilitate improvement in movement, function, and ADLs as indicated by Functional Deficits. [] Progressing: [] Met: [] Not Met: [] Adjusted     Long Term Goals: To be achieved in: 4-6 weeks  1.  Patient will reach FOTO predicted score of at least 57 to assist with reaching prior level of function with activities such as reaching overhead. [] Progressing: [] Met: [] Not Met: [] Adjusted  2. Patient will demonstrate increased AR[facility] R shoulder to allow for proper joint functioning as indicated by patients Functional Deficits. [] Progressing: [] Met: [] Not Met: [] Adjusted  3. Patient will demonstrate an increase in Strength to 4/5 for R shoulder to allow for proper functional mobility as indicated by patients Functional Deficits. [] Progressing: [] Met: [] Not Met: [] Adjusted  4. Patient will return to reaching overhead and reaching behind her back without increased symptoms or restriction. [] Progressing: [] Met: [] Not Met: [] Adjusted  5. Patient will return to work full duty without restrictions. [] Progressing: [] Met: [] Not Met: [] Adjusted           ASSESSMENT:  Pt demonstrates improved shoulder PROM in all directions, but is still limited by pain in all directions. Good response to cervical traction today with reports of decreased R UE pain noted afterwards. Added supine cervical retractions and no monies today with cues required for appropriate form. Treatment/Activity Tolerance:  [] Patient tolerated treatment well [] Patient limited by fatique  [x] Patient limited by pain  [] Patient limited by other medical complications  [] Other:     Overall Progression Towards Functional goals/ Treatment Progress Update:  [] Patient is progressing as expected towards functional goals listed. [] Progression is slowed due to complexities/Impairments listed. [] Progression has been slowed due to co-morbidities.   [x] Plan just implemented, too soon to assess goals progression <30days   [] Goals require adjustment due to lack of progress  [] Patient is not progressing as expected and requires additional follow up with physician  [] Other    Return to Play: (if applicable)   []  Stage 1: Intro to Strength   []  Stage 2: Dynamic Strength and Intro to Plyometrics   []  Stage 3: Advanced Plyometrics and Intro to Throwing   []  Stage 4: Sport specific Training/Return to Sport     []  Ready to Return to Play, Meets All Above Stages   []  Not Ready for Return to Sports   Comments:      Prognosis for POC: [] Good [x] Fair  [] Poor    Patient requires continued skilled intervention: [x] Yes  [] No      PLAN: Decrease pain, improve shoulder AROM and strength. [x] Continue per plan of care [] Alter current plan (see comments)  [] Plan of care initiated [] Hold pending MD visit [] Discharge    Electronically signed by: Jani Sarmiento PT     Note: If patient does not return for scheduled/recommended follow up visits, this note will serve as a discharge from care along with the most recent update on progress.

## 2022-11-28 ENCOUNTER — OFFICE VISIT (OUTPATIENT)
Dept: ORTHOPEDIC SURGERY | Age: 57
End: 2022-11-28
Payer: COMMERCIAL

## 2022-11-28 VITALS — BODY MASS INDEX: 45.89 KG/M2 | WEIGHT: 259 LBS | HEIGHT: 63 IN

## 2022-11-28 DIAGNOSIS — M75.81 ROTATOR CUFF TENDINITIS, RIGHT: ICD-10-CM

## 2022-11-28 DIAGNOSIS — M19.019 AC JOINT ARTHROPATHY: ICD-10-CM

## 2022-11-28 DIAGNOSIS — S46.911A STRAIN OF RIGHT SHOULDER, INITIAL ENCOUNTER: Primary | ICD-10-CM

## 2022-11-28 DIAGNOSIS — S46.011A TRAUMATIC INCOMPLETE TEAR OF RIGHT ROTATOR CUFF, INITIAL ENCOUNTER: ICD-10-CM

## 2022-11-28 PROCEDURE — 99213 OFFICE O/P EST LOW 20 MIN: CPT | Performed by: PHYSICIAN ASSISTANT

## 2022-11-29 ENCOUNTER — HOSPITAL ENCOUNTER (OUTPATIENT)
Dept: PHYSICAL THERAPY | Age: 57
Setting detail: THERAPIES SERIES
Discharge: HOME | End: 2022-11-29
Payer: COMMERCIAL

## 2022-11-29 NOTE — PROGRESS NOTES
12 [facility]  History and Physical  Shoulder Pain    Date:  2022    Name:  Oksana Olivares  Address:  [address] [place] [address]    :  1965      Age:   56 y.o.    SSN:  [id number]      Medical Record Number:  [id number]    Reason for Visit:    Shoulder Pain ([facility] F/U RIGHT SHOULDER)      HPI:   Oksana Olivares is a 56 y.o. female who presents to our office today for follow up of the right shoulder pain. Patient unfortunately had a work-related injury on 2022. She reports she never had any problems with her neck or right shoulder until this injury. 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 Since then she has had diffuse pain over the right shoulder and also down the posterior right shoulder along the scapula. At her last visit she was diagnosed with a partial rotator cuff tear with rotator cuff tendinitis along with a shoulder strain. Her MRI also showed severe AC joint arthropathy. Patient has many allergies and is unable to take oral NSAIDs, oral steroids or injectable steroids. Patient was asked to start formal physical therapy which she reports she has been doing since her last visit with us. Patient reports that she has not noted a lot of improvement. She continues to have a fair amount of pain. It is affecting her shoulder movement, her activities of daily living along with sleep. Pain Assessment  Location of Pain: Shoulder  Location Modifiers: Right  Severity of Pain: 6  Quality of Pain: Sharp, Dull, Aching  Duration of Pain: Persistent  Frequency of Pain: Constant  Aggravating Factors:  Other (Comment), Exercise, Straightening, Stretching  Limiting Behavior: Yes  Relieving Factors: Rest, Ice, Exercise, Other (Comment)  Result of Injury: Yes  Work-Related Injury: Yes  Are there other pain locations you wish to document?: No    No notes on file    Review of Systems:  A 14 point review of systems available in the scanned medical record as documented by the patient and reviewed on 11/29/2022. The review is negative with the exception of those things mentioned in the History of Present Illness and Past Medical History. Past Medical History:  Patient's medications, allergies, past medical, surgical, social and family histories were reviewed and updated as appropriate. Allergies: Allergies   Allergen Reactions    Apple Itching     itching of skin ,lips,throat    Denis Hives     itching of skin ,lips,throat    Mangifera Indica Hives     itching of skin ,lips,throat    Strawberry Hives     itching of skin ,lips,throat       Physical Exam:  There were no vitals filed for this visit. General: Kellee Gayle is a healthy and well appearing 56 y.o. female who is sitting comfortably in our office in acute distress. Skin:  There are no skin lesions, cellulitis, or extreme edema. The patient has warm and well-perfused Bilateral upper extremities with brisk capillary refill. Eyes: Extra-ocular muscles intact  Mouth: Oral mucosa moist. No perioral lesions  Pulm: Respirations unlabored and regular. Neuro: Alert and oriented x3    right Shoulder Exam:  Inspection:  No gross deformities, no signs of infection. Palpation:  There is no crepitus. Tenderness to palpation over the [facility] joint, rotator cuff footprint and bicipital groove. Non-tender to palpation over the posterior joint line. Active Range of Motion: Forward elevation of 130, abduction of 110, external rotation with elbow at the side 35, internal rotation to the back is L1    Passive Range of Motion: Passively forward elevation can be further increased to 145 with pain.     Strength: External rotation with resistance with elbow at the side +4/5, internal rotation with resistance with elbow at the side +4/5, Champagne toast testing +4/5, Jobes test +4/5    Special Tests:   No Gavin muscle deformity. Neurovascular: Sensation to light touch is intact, no motor deficits, palpable radial pulses 2+    Additional Examinations:    Examination of the contralateral extremity does not show any tenderness, deformity or injury. Range of motion is unremarkable. There is no gross instability. There are no rashes, ulcerations or lesions. Strength and tone are normal.      Radiographic:  MRI right shoulder 8/10/22  FINDINGS:  No acute fracture or bone marrow infiltrative process. Severe AC joint    osteoarthritis with inferior marginal osteophytes. Subacromial spur. Mild subacromial-subdeltoid bursal edema. Superior rotator cuff tendinopathy with low-grade partial-thickness bursal surface/interstitial    tear of the supraspinatus insertion. Teres minor is intact. Subscapularis tendinopathy. Rotator cuff muscle bulk and signal are normal.       The intraarticular long head biceps tendon is not definitively seen. This could relate to    technical factors or complete rupture. Motion limits evaluation of the glenoid labrum. No displaced tear is demonstrated. Small    superficial fissures of the humeral head and glenoid articular cartilage. No joint effusion. Infiltration of the subcoracoid fat can be seen with adhesive capsulitis. No lymphadenopathy. CONCLUSION:   1. Superior rotator cuff tendinopathy with low-grade partial-thickness/interstitial tearing of    the supraspinatus tendon footprint. Subscapularis tendinopathy. 2. Intraarticular long head biceps tendon is not well visualized. This could relate to    complete rupture or technical factors. 3. Findings of subacromial impingement. 4. Infiltration of the subcoracoid fat can be seen with adhesive capsulitis.      Assessment:  Alyssa Weeks is a 56 y.o. female who had a work-related injury currently with a right shoulder strain, partial rotator cuff tear with rotator

## 2022-12-02 ENCOUNTER — HOSPITAL ENCOUNTER (OUTPATIENT)
Dept: PHYSICAL THERAPY | Age: 57
Setting detail: THERAPIES SERIES
Discharge: HOME | End: 2022-12-02
Payer: COMMERCIAL

## 2022-12-02 PROCEDURE — 97140 MANUAL THERAPY 1/> REGIONS: CPT | Performed by: SPECIALIST/TECHNOLOGIST

## 2022-12-02 PROCEDURE — 97110 THERAPEUTIC EXERCISES: CPT | Performed by: SPECIALIST/TECHNOLOGIST

## 2022-12-02 NOTE — FLOWSHEET NOTE
[employer]    Physical Therapy Treatment Note/ Progress Report:     Date:  2022    Patient Name:  Mark Thompson    :  1965  MRN: [id number]  Medical Diagnosis:  Strain of unspecified muscle, fascia and tendon at shoulder and upper arm level, right arm, initial encounter [S46.715K]  Strain of muscle(s) and tendon(s) of the rotator cuff of right shoulder, initial encounter [S46.011J]  Treatment Diagnosis:  No diagnosis found. Insurance/Certification information:  PT Insurance Information: Workers Compensation - 18 visits approved 10/28/22 - 22  Physician Information:  Tashia Mejia MD    Plan of care signed (Y/N): []  Yes [x]  No  Date sent:     Date of Patient follow up with Physician:      Progress Report: []  Yes  [x]  No     Functional Scale:           Date assessed:  FOTO physical FS primary measure score = 47; risk adjusted = 51  22    Date Range for reporting period:  Beginnin22  Ending:      Progress report due (10 Rx/or 30 days whichever is less):      Recertification due (POC duration/ or 90 days whichever is less): 22     Visit # Insurance Allowable Auth required? Date Range   7 18 [x]  Yes  []  No 10/28/22 - 22     Pain level:  4-10/10 W/movement but at rest its generally ok     SUBJECTIVE:  Pt reports that shoulder, pec and scapula continue to be sore. Uses hot water in the shower but no heat/ ice . Uses voltaren gel/ biofreeze daily. Stopped taking Nsaids due to fear of food allergy. Takes tylenol 650 which helps her symptoms. Continues to have pain across the RT chest wall and shoulder. She feels like her Rt  shoulder moves fairly well but has general global pain throughout her RT shoulder/ chest wall/ ribcage etc.   Still has numbness in her hands on RT side related to fall.   Dec 7 Hasan     OBJECTIVE:   Observation:   Test measurements:    11/3/22: R shoulder PROM: flex ~ 90 deg, ER ~20 deg   12/2 AROM RT shoulder ~ 165  Abd ~120 TTP over the RT upper traps/ into pectoral major muscle groups    RESTRICTIONS/PRECAUTIONS: R shoulder pain    Exercises/Interventions: 36'  Therapeutic Ex 20' Wt / Resistance Sets/sec Reps Notes          Scap retractions  1 10x    Table slides w/use of slideboard  5\" 15    Seated cane ER AAROM  5\" 15x    Supine cane press  2 10    Pec stretch gentle  10\" 5 Hands low   TB Rows red 1 15x Performed in sitting due to LBP   TB Ext Red 1 15x Performed in sitting due to LBP   Shoulder IR/ER submax iso's   5\" 10x ea Performed in sitting due to LBP   Seated Cervical retractions  5\" 10    No monies yellow 1 10              Therapeutic Activities                                                                      Manual Intervention 20'    Pt elevated in supine w/2 pillows   Shld /GH Mobs       Post Cap mobs       Thoracic/Rib manipulation       STM 10'   R pec, UT, teres minor   PROM MT 10'      Manual cervical traction/ lateral flexion to left 5'             NMR re-education                                                                 Pt. Education:  -pt educated on diagnosis, prognosis and expectations for rehab  -all pt questions were answered    Home Exercise Program:  [address] access code [email or web address]      Therapeutic Exercise and NMR EXR  [x] (14454) Provided verbal/tactile cueing for activities related to strengthening, flexibility, endurance, ROM  for improvements in scapular, scapulothoracic and UE control with self care, reaching, carrying, lifting, house/yardwork, driving/computer work.    [] (52432) Provided verbal/tactile cueing for activities related to improving balance, coordination, kinesthetic sense, posture, motor skill, proprioception  to assist with  scapular, scapulothoracic and UE control with self care, reaching, carrying, lifting, house/yardwork, driving/computer work.     Therapeutic Activities:    [] (52259 or 38107) Provided verbal/tactile cueing for activities related to improving balance, coordination, kinesthetic sense, posture, motor skill, proprioception and motor activation to allow for proper function of scapular, scapulothoracic and UE control with self care, carrying, lifting, driving/computer work. Home Exercise Program:    [x] (05146) Reviewed/Progressed HEP activities related to strengthening, flexibility, endurance, ROM of scapular, scapulothoracic and UE control with self care, reaching, carrying, lifting, house/yardwork, driving/computer work  [] (14599) Reviewed/Progressed HEP activities related to improving balance, coordination, kinesthetic sense, posture, motor skill, proprioception of scapular, scapulothoracic and UE control with self care, reaching, carrying, lifting, house/yardwork, driving/computer work      Manual Treatments:  PROM / STM / Oscillations-Mobs:  G-I, II, III, IV (PA's, Inf., Post.)  [x] (35557) Provided manual therapy to mobilize soft tissue/joints of cervical/CT, scapular GHJ and UE for the purpose of modulating pain, promoting relaxation,  increasing ROM, reducing/eliminating soft tissue swelling/inflammation/restriction, improving soft tissue extensibility and allowing for proper ROM for normal function with self care, reaching, carrying, lifting, house/yardwork, driving/computer work    Modalities: 10' CP to Rt shoulder    Charges:  Timed Code Treatment Minutes: 40   Total Treatment Minutes: 50       [] EVAL (LOW) 88071 (typically 20 minutes face-to-face)  [] EVAL (MOD) 63185 (typically 30 minutes face-to-face)  [] EVAL (HIGH) 18144 (typically 45 minutes face-to-face)  [] RE-EVAL     [x] SC(77763) x  2   [] IONTO (22761)  [] NMR (88425) x     [] VASO (30480)  [x] Manual (42930) x  1   [] Other:  [] TA (89080)x     [] Mech Traction (67271)  [] ES(attended) (58630)     [] ES (un) (46871):      If BWC Please Indicate Time In/Out and Total Minutes  CPT Code Time in Time out Total Min   86430 918 266 25'   69254 086 301 25'                                  GOALS:  Patient stated goal: \"to get back to work\"  [] Progressing: [] Met: [] Not Met: [] Adjusted     Therapist goals for Patient:   Short Term Goals: To be achieved in: 2 weeks  1. Independent in HEP and progression per patient tolerance, in order to prevent re-injury. [] Progressing: [] Met: [] Not Met: [] Adjusted  2. Patient will have a decrease in pain to facilitate improvement in movement, function, and ADLs as indicated by Functional Deficits. [] Progressing: [] Met: [] Not Met: [] Adjusted     Long Term Goals: To be achieved in: 4-6 weeks  1. Patient will reach FOTO predicted score of at least 57 to assist with reaching prior level of function with activities such as reaching overhead. [] Progressing: [] Met: [] Not Met: [] Adjusted  2. Patient will demonstrate increased [facility] for R shoulder to allow for proper joint functioning as indicated by patients Functional Deficits. [] Progressing: [] Met: [] Not Met: [] Adjusted  3. Patient will demonstrate an increase in Strength to 4/5 for R shoulder to allow for proper functional mobility as indicated by patients Functional Deficits. [] Progressing: [] Met: [] Not Met: [] Adjusted  4. Patient will return to reaching overhead and reaching behind her back without increased symptoms or restriction. [] Progressing: [] Met: [] Not Met: [] Adjusted  5. Patient will return to work full duty without restrictions. [] Progressing: [] Met: [] Not Met: [] Adjusted           ASSESSMENT:  Pt demonstrates improved shoulder PROM in all directions, but is still limited by pain in all directions. Increased tenderness over her RT side chest wall, pectoral muscles, Rt shoulder. Pt fatigues quickly and needed some breaks with isometrics and using the theraband. Good response to cervical traction today with reports of decreased R UE pain noted afterwards.  Added supine cervical retractions and no monies today with cues required for appropriate form. Treatment/Activity Tolerance:  [] Patient tolerated treatment well [] Patient limited by fatique  [x] Patient limited by pain  [] Patient limited by other medical complications  [] Other:     Overall Progression Towards Functional goals/ Treatment Progress Update:  [] Patient is progressing as expected towards functional goals listed. [] Progression is slowed due to complexities/Impairments listed. [] Progression has been slowed due to co-morbidities. [x] Plan just implemented, too soon to assess goals progression <30days   [] Goals require adjustment due to lack of progress  [] Patient is not progressing as expected and requires additional follow up with physician  [] Other    Return to Play: (if applicable)   []  Stage 1: Intro to Strength   []  Stage 2: Dynamic Strength and Intro to Plyometrics   []  Stage 3: Advanced Plyometrics and Intro to Throwing   []  Stage 4: Sport specific Training/Return to Sport     []  Ready to Return to Play, Meets All Above Stages   []  Not Ready for Return to Sports   Comments:      Prognosis for POC: [] Good [x] Fair  [] Poor    Patient requires continued skilled intervention: [x] Yes  [] No      PLAN: Decrease pain, improve shoulder AROM and strength. [x] Continue per plan of care [] Alter current plan (see comments)  [] Plan of care initiated [] Hold pending MD visit [] Discharge    Electronically signed by: Jefferson Miller PTA, [id number]    Note: If patient does not return for scheduled/recommended follow up visits, this note will serve as a discharge from care along with the most recent update on progress.

## 2022-12-05 ENCOUNTER — HOSPITAL ENCOUNTER (OUTPATIENT)
Dept: PHYSICAL THERAPY | Age: 57
Setting detail: THERAPIES SERIES
Discharge: HOME | End: 2022-12-05
Payer: COMMERCIAL

## 2022-12-05 PROCEDURE — 97110 THERAPEUTIC EXERCISES: CPT

## 2022-12-05 PROCEDURE — 97140 MANUAL THERAPY 1/> REGIONS: CPT

## 2022-12-05 NOTE — FLOWSHEET NOTE
[employer] [facility]    Physical Therapy Treatment Note/ Progress Report:     Date:  2022    Patient Name:  John Alaniz    :  1965  MRN: [id number]  Medical Diagnosis:  Strain of unspecified muscle, fascia and tendon at shoulder and upper arm level, right arm, initial encounter [S46.954X]  Strain of muscle(s) and tendon(s) of the rotator cuff of right shoulder, initial encounter [S46.011Q]  Treatment Diagnosis:  No diagnosis found. Insurance/Certification information:  PT Insurance Information: Workers Compensation - 18 visits approved 10/28/22 - 22  Physician Information:  Osito Flores MD    Plan of care signed (Y/N): []  Yes [x]  No  Date sent:     Date of Patient follow up with Physician:      Progress Report: []  Yes  [x]  No     Functional Scale:           Date assessed:  FOTO physical FS primary measure score = 47; risk adjusted = 51  22    Date Range for reporting period:  Beginnin22  Ending:      Progress report due (10 Rx/or 30 days whichever is less): 30     Recertification due (POC duration/ or 90 days whichever is less): 22     Visit # Insurance Allowable Auth required? Date Range   8 18 [x]  Yes  []  No 10/28/22 - 22     Pain level:  4-10/10 W/movement but at rest its generally ok     SUBJECTIVE:  Pt reports that her shoulder, pec and scapula continue to be very sore overall. Pt states that biofreeze seems to help the most. Pt reports still having N/T in R hand, primarily when she sleeps on her R side. F/U with Dr. Erfain Eldridge .      OBJECTIVE:   Observation:   Test measurements:    11/3/22: R shoulder PROM: flex ~ 90 deg, ER ~20 deg    AROM RT shoulder ~ 165  Abd ~120 TTP over the RT upper traps/ into pectoral major muscle groups    RESTRICTIONS/PRECAUTIONS: R shoulder pain    Exercises/Interventions:   Therapeutic Ex 20' Wt / Resistance Sets/sec Reps Notes          Scap retractions  1 10x Table slides w/use of slideboard  5\" 15    Seated cane ER AAROM  5\" 15x    Supine cane press  2 10    Pec stretch gentle  10\" 5 Hands low   TB Rows red 1 15x Performed in sitting due to LBP   TB Ext Red 1 15x Performed in sitting due to LBP   TB ER red 1 10 Performed in sitting due to LBP   Seated Cervical retractions  5\" 10    No monies yellow 1 10              Therapeutic Activities 5'              Seated scaption to 90 degrees  2 8                                                     Manual Intervention 20'    Pt elevated in supine w/2 pillows   Shld /GH Mobs       Post Cap mobs       Thoracic/Rib manipulation       STM 10'   R pec, UT, teres minor   PROM MT 10'      Manual cervical traction/ lateral flexion to left 5'             NMR re-education                                                                 Pt. Education:  -pt educated on diagnosis, prognosis and expectations for rehab  -all pt questions were answered    Home Exercise Program:  [address] [place] [address] access code [email or web address]      Therapeutic Exercise and NMR EXR  [x] (28502) Provided verbal/tactile cueing for activities related to strengthening, flexibility, endurance, ROM  for improvements in scapular, scapulothoracic and UE control with self care, reaching, carrying, lifting, house/yardwork, driving/computer work.    [] (31205) Provided verbal/tactile cueing for activities related to improving balance, coordination, kinesthetic sense, posture, motor skill, proprioception  to assist with  scapular, scapulothoracic and UE control with self care, reaching, carrying, lifting, house/yardwork, driving/computer work. Therapeutic Activities:    [] (82314 or 87160) Provided verbal/tactile cueing for activities related to improving balance, coordination, kinesthetic sense, posture, motor skill, proprioception and motor activation to allow for proper function of scapular, scapulothoracic and UE control with self care, carrying, lifting, driving/computer work. Home Exercise Program:    [x] (26443) Reviewed/Progressed HEP activities related to strengthening, flexibility, endurance, ROM of scapular, scapulothoracic and UE control with self care, reaching, carrying, lifting, house/yardwork, driving/computer work  [] (22640) Reviewed/Progressed HEP activities related to improving balance, coordination, kinesthetic sense, posture, motor skill, proprioception of scapular, scapulothoracic and UE control with self care, reaching, carrying, lifting, house/yardwork, driving/computer work      Manual Treatments:  PROM / STM / Oscillations-Mobs:  G-I, II, III, IV (PA's, Inf., Post.)  [x] (83656) Provided manual therapy to mobilize soft tissue/joints of cervical/CT, scapular GHJ and UE for the purpose of modulating pain, promoting relaxation,  increasing ROM, reducing/eliminating soft tissue swelling/inflammation/restriction, improving soft tissue extensibility and allowing for proper ROM for normal function with self care, reaching, carrying, lifting, house/yardwork, driving/computer work    Modalities: 10' game ready to Rt shoulder    Charges:  Timed Code Treatment Minutes: 45   Total Treatment Minutes: 55       [] EVAL (LOW) 01221 (typically 20 minutes face-to-face)  [] EVAL (MOD) 67881 (typically 30 minutes face-to-face)  [] EVAL (HIGH) 87592 (typically 45 minutes face-to-face)  [] RE-EVAL     [x] BB(11013) x  2   [] IONTO (49095)  [] NMR (61162) x     [] VASO (39689)  [x] Manual (30331) x  1   [] Other:  [] TA (01126)x     [] Mech Traction (45652)  [] ES(attended) (53203)     [] ES (un) (02433): If BWC Please Indicate Time In/Out and Total Minutes  CPT Code Time in Time out Total Min   80912 2:05 2:25 21   01844 2:25 2:45 21   57396 2:45 2:50 5                            GOALS:  Patient stated goal: \"to get back to work\"  [] Progressing: [] Met: [] Not Met: [] Adjusted     Therapist goals for Patient:   Short Term Goals: To be achieved in: 2 weeks  1.  Independent in HEP and progression per patient tolerance, in order to prevent re-injury. [] Progressing: [] Met: [] Not Met: [] Adjusted  2. Patient will have a decrease in pain to facilitate improvement in movement, function, and ADLs as indicated by Functional Deficits. [] Progressing: [] Met: [] Not Met: [] Adjusted     Long Term Goals: To be achieved in: 4-6 weeks  1. Patient will reach FOTO predicted score of at least 57 to assist with reaching prior level of function with activities such as reaching overhead. [] Progressing: [] Met: [] Not Met: [] Adjusted  2. Patient will demonstrate increased [facility] for R shoulder to allow for proper joint functioning as indicated by patients Functional Deficits. [] Progressing: [] Met: [] Not Met: [] Adjusted  3. Patient will demonstrate an increase in Strength to 4/5 for R shoulder to allow for proper functional mobility as indicated by patients Functional Deficits. [] Progressing: [] Met: [] Not Met: [] Adjusted  4. Patient will return to reaching overhead and reaching behind her back without increased symptoms or restriction. [] Progressing: [] Met: [] Not Met: [] Adjusted  5. Patient will return to work full duty without restrictions. [] Progressing: [] Met: [] Not Met: [] Adjusted           ASSESSMENT:  Pt demonstrates improved shoulder PROM in all directions, but is still limited by pain in all directions. Increased tenderness over her RT side chest wall, pectoral muscles, Rt shoulder. Pt fatigues quickly and needed some breaks with isometrics and using the theraband. Added seated scaption and TB ER with fatigue noted. Good response to cervical traction today with reports of decreased R UE pain noted afterwards.     Treatment/Activity Tolerance:  [] Patient tolerated treatment well [] Patient limited by fatique  [x] Patient limited by pain  [] Patient limited by other medical complications  [] Other:     Overall Progression Towards Functional goals/ Treatment Progress Update:  [] Patient is progressing as expected towards functional goals listed. [] Progression is slowed due to complexities/Impairments listed. [] Progression has been slowed due to co-morbidities. [x] Plan just implemented, too soon to assess goals progression <30days   [] Goals require adjustment due to lack of progress  [] Patient is not progressing as expected and requires additional follow up with physician  [] Other    Return to Play: (if applicable)   []  Stage 1: Intro to Strength   []  Stage 2: Dynamic Strength and Intro to Plyometrics   []  Stage 3: Advanced Plyometrics and Intro to Throwing   []  Stage 4: Sport specific Training/Return to Sport     []  Ready to Return to Play, Meets All Above Stages   []  Not Ready for Return to Sports   Comments:      Prognosis for POC: [] Good [x] Fair  [] Poor    Patient requires continued skilled intervention: [x] Yes  [] No      PLAN: Decrease pain, improve shoulder AROM and strength. [x] Continue per plan of care [] Alter current plan (see comments)  [] Plan of care initiated [] Hold pending MD visit [] Discharge    Electronically signed by: Sandi Troncoso PT    Note: If patient does not return for scheduled/recommended follow up visits, this note will serve as a discharge from care along with the most recent update on progress.

## 2022-12-07 ENCOUNTER — HOSPITAL ENCOUNTER (OUTPATIENT)
Dept: PHYSICAL THERAPY | Age: 57
Setting detail: THERAPIES SERIES
Discharge: HOME | End: 2022-12-07
Payer: COMMERCIAL

## 2022-12-07 PROCEDURE — 97140 MANUAL THERAPY 1/> REGIONS: CPT | Performed by: SPECIALIST/TECHNOLOGIST

## 2022-12-07 PROCEDURE — 97110 THERAPEUTIC EXERCISES: CPT | Performed by: SPECIALIST/TECHNOLOGIST

## 2022-12-07 NOTE — FLOWSHEET NOTE
[employer]    Physical Therapy Treatment Note/ Progress Report:     Date:  2022    Patient Name:  Juany Caro    :  1965  MRN: [id number]  Medical Diagnosis:  Strain of unspecified muscle, fascia and tendon at shoulder and upper arm level, right arm, initial encounter [S46.912B]  Strain of muscle(s) and tendon(s) of the rotator cuff of right shoulder, initial encounter [S46.011A]  Treatment Diagnosis:  No diagnosis found. Insurance/Certification information:  PT Insurance Information: Workers Compensation - 18 visits approved 10/28/22 - 22  Physician Information:  Jaclynn Mortimer, MD    Plan of care signed (Y/N): []  Yes [x]  No  Date sent:     Date of Patient follow up with Physician:      Progress Report: []  Yes  [x]  No     Functional Scale:           Date assessed:  FOTO physical FS primary measure score = 47; risk adjusted = 51  22    Date Range for reporting period:  Beginnin22  Ending:      Progress report due (10 Rx/or 30 days whichever is less):      Recertification due (POC duration/ or 90 days whichever is less): 22     Visit # Insurance Allowable Auth required? Date Range    18 [x]  Yes  []  No 10/28/22 - 22     Pain level:  7/10 W/movement but at rest its generally ok     SUBJECTIVE:  Pt reports having increased pain in her Rt arm/ chest wall and midback since yesterday. Not sure why. Hasn't used her Biofreeze / voltaren gel/acetominophen  which will help generally. Has increased pain when attempting to take her Rt arm away from her body and has increased pain. F/U with Dr. Dakota Pugh . OBJECTIVE:   Observation:   Test measurements:    11/3/22: R shoulder PROM: flex ~ 90 deg, ER ~20 deg    AROM RT shoulder ~ 165  Abd ~120 TTP over the RT upper traps/ into pectoral major muscle groups    observed pt reaching overhead [employer]" with no hesitation entering clinic. RESTRICTIONS/PRECAUTIONS: R shoulder pain    Exercises/Interventions: 25'  Therapeutic Ex 20' Wt / Resistance Sets/sec Reps Notes   Shrugs   1 15x 12/7   Scap retractions  1 15x 12/7   Table slides w/use of slideboard  5\" 15    Seated cane ER AAROM  5\" 15x 12/7   Supine cane press   Seated cane flexion   1  5\" 10  10x 12/7  12/7   Pec stretch gentle  10\" 5 Hands low   TB Rows Red 1 15x Performed in sitting due to LBP   TB Ext Red 1 15x Performed in sitting due to LBP   TB ER HOLD due to increased pain 12/7   Seated Cervical retractions  5\" 10 12/7   No monies yellow 1 10 12/7             Therapeutic Activities 5'              Seated scaption to 90 degrees  2 8 HOLD                                                    Manual Intervention 20'    Pt elevated in supine w/2 pillows   Shld /GH Mobs       Post Cap mobs       Thoracic/Rib manipulation       STM 10'   R pec, UT, teres minor   PROM   RT. shoulder 10'      Manual cervical traction/ lateral flexion to left   12/7 hypervolt over Rt shoulder/ pec wall/ UT          NMR re-education                                                                 Pt. Education:  -pt educated on diagnosis, prognosis and expectations for rehab  -all pt questions were answered    Home Exercise Program:  [address] access code [email or web address]      Therapeutic Exercise and NMR EXR  [x] (21918) Provided verbal/tactile cueing for activities related to strengthening, flexibility, endurance, ROM  for improvements in scapular, scapulothoracic and UE control with self care, reaching, carrying, lifting, house/yardwork, driving/computer work.    [] (57159) Provided verbal/tactile cueing for activities related to improving balance, coordination, kinesthetic sense, posture, motor skill, proprioception  to assist with  scapular, scapulothoracic and UE control with self care, reaching, carrying, lifting, house/yardwork, driving/computer work.     Therapeutic Activities:    [] (68001 or 58306) Provided verbal/tactile cueing for activities related to improving balance, coordination, kinesthetic sense, posture, motor skill, proprioception and motor activation to allow for proper function of scapular, scapulothoracic and UE control with self care, carrying, lifting, driving/computer work. Home Exercise Program:    [x] (78851) Reviewed/Progressed HEP activities related to strengthening, flexibility, endurance, ROM of scapular, scapulothoracic and UE control with self care, reaching, carrying, lifting, house/yardwork, driving/computer work  [] (29541) Reviewed/Progressed HEP activities related to improving balance, coordination, kinesthetic sense, posture, motor skill, proprioception of scapular, scapulothoracic and UE control with self care, reaching, carrying, lifting, house/yardwork, driving/computer work      Manual Treatments:  PROM / STM / Oscillations-Mobs:  G-I, II, III, IV (PA's, Inf., Post.)  [x] (19263) Provided manual therapy to mobilize soft tissue/joints of cervical/CT, scapular GHJ and UE for the purpose of modulating pain, promoting relaxation,  increasing ROM, reducing/eliminating soft tissue swelling/inflammation/restriction, improving soft tissue extensibility and allowing for proper ROM for normal function with self care, reaching, carrying, lifting, house/yardwork, driving/computer work    Modalities: 10' CP to RT shoulder/ ribcage and thoracic spine    Charges:  Timed Code Treatment Minutes: 45   Total Treatment Minutes: 55       [] EVAL (LOW) 26297 (typically 20 minutes face-to-face)  [] EVAL (MOD) 01911 (typically 30 minutes face-to-face)  [] EVAL (HIGH) 40313 (typically 45 minutes face-to-face)  [] RE-EVAL     [x] MU(25933) x  2   [] IONTO (67844)  [] NMR (56952) x     [] VASO (33345)  [x] Manual (97203) x  1   [] Other:  [] TA (25000)x     [] [facility] T[facility] (17629)  [] ES(attended) (14514)     [] ES (un) (41476):      If BWC Please Indicate Time In/Out and Total Minutes  CPT Code Time in Time out Total Min   71896 2:05 2:25 20   25549 2:25 2:40 15               OTHER     CP  241 508                 GOALS:  Patient stated goal: \"to get back to work\"  [] Progressing: [] Met: [] Not Met: [] Adjusted     Therapist goals for Patient:   Short Term Goals: To be achieved in: 2 weeks  1. Independent in HEP and progression per patient tolerance, in order to prevent re-injury. [] Progressing: [] Met: [] Not Met: [] Adjusted  2. Patient will have a decrease in pain to facilitate improvement in movement, function, and ADLs as indicated by Functional Deficits. [] Progressing: [] Met: [] Not Met: [] Adjusted     Long Term Goals: To be achieved in: 4-6 weeks  1. Patient will reach FOTO predicted score of at least 57 to assist with reaching prior level of function with activities such as reaching overhead. [] Progressing: [] Met: [] Not Met: [] Adjusted  2. Patient will demonstrate [facility] for [facility] to allow for proper joint functioning as indicated by patients Functional Deficits. [] Progressing: [] Met: [] Not Met: [] Adjusted  3. Patient will demonstrate an increase in Strength to 4/5 for R shoulder to allow for proper functional mobility as indicated by patients Functional Deficits. [] Progressing: [] Met: [] Not Met: [] Adjusted  4. Patient will return to reaching overhead and reaching behind her back without increased symptoms or restriction. [] Progressing: [] Met: [] Not Met: [] Adjusted  5. Patient will return to work full duty without restrictions. [] Progressing: [] Met: [] Not Met: [] Adjusted           ASSESSMENT:  Pt demonstrates improved shoulder PROM in all directions, but is still limited by pain in all directions. Overall increased pain in all areas specifically with her Rt shoulder/ chest wall and upper trap today but demonstrated increased ROM in all planes. Increased tenderness over her RT side chest wall, pectoral muscles, Rt shoulder w/ STM/ hypervolt.  Held isometrics and using TB today  due to increase in pain. Pt admits to sitting most of the day and then having moments where she grabs milk out of the fridge which is more stress than her arm is ready for. Treatment/Activity Tolerance:  [] Patient tolerated treatment well [] Patient limited by fatique  [x] Patient limited by pain  [] Patient limited by other medical complications  [] Other:     Overall Progression Towards Functional goals/ Treatment Progress Update:  [] Patient is progressing as expected towards functional goals listed. [] Progression is slowed due to complexities/Impairments listed. [] Progression has been slowed due to co-morbidities. [x] Plan just implemented, too soon to assess goals progression <30days   [] Goals require adjustment due to lack of progress  [] Patient is not progressing as expected and requires additional follow up with physician  [] Other    Return to Play: (if applicable)   []  Stage 1: Intro to Strength   []  Stage 2: Dynamic Strength and Intro to Plyometrics   []  Stage 3: Advanced Plyometrics and Intro to Throwing   []  Stage 4: Sport specific Training/Return to Sport     []  Ready to Return to Play, Meets All Above Stages   []  Not Ready for Return to Sports   Comments:      Prognosis for POC: [] Good [x] Fair  [] Poor    Patient requires continued skilled intervention: [x] Yes  [] No      PLAN: Decrease pain, improve shoulder AROM and strength. Advised to d/c AROM with RT shoulder abduction during the day and keep herself moving as able. [x] Continue per plan of care [] Alter current plan (see comments)  [] Plan of care initiated [] Hold pending MD visit [] Discharge    Electronically signed by: Marcelo Vo PTA, [id number]    Note: If patient does not return for scheduled/recommended follow up visits, this note will serve as a discharge from care along with the most recent update on progress.

## 2022-12-12 ENCOUNTER — HOSPITAL ENCOUNTER (OUTPATIENT)
Dept: PHYSICAL THERAPY | Age: 57
Setting detail: THERAPIES SERIES
Discharge: HOME | End: 2022-12-12
Payer: COMMERCIAL

## 2022-12-12 PROCEDURE — 97140 MANUAL THERAPY 1/> REGIONS: CPT | Performed by: PHYSICAL THERAPIST

## 2022-12-12 PROCEDURE — 97530 THERAPEUTIC ACTIVITIES: CPT | Performed by: PHYSICAL THERAPIST

## 2022-12-12 PROCEDURE — 97110 THERAPEUTIC EXERCISES: CPT | Performed by: PHYSICAL THERAPIST

## 2022-12-12 NOTE — FLOWSHEET NOTE
[facility]    Physical Therapy Treatment Note/ Progress Report:     Date:  2022    Patient Name:  Elo Cruz    :  1965  MRN: [id number]  Medical Diagnosis:  Strain of unspecified muscle, fascia and tendon at shoulder and upper arm level, right arm, initial encounter [S46.962Y]  Strain of muscle(s) and tendon(s) of the rotator cuff of right shoulder, initial encounter [S46.011A]  Treatment Diagnosis:   Chronic right shoulder pain  M25.511       G89.29           Insurance/Certification information:  PT Insurance Information: Workers Compensation - 18 visits approved 10/28/22 - 22  Physician Information:  Jessika Pleitez MD    Plan of care signed (Y/N): []  Yes [x]  No  Date sent:     Date of Patient follow up with Physician:      Progress Report: []  Yes  [x]  No     Functional Scale:           Date assessed:  FOTO physical FS primary measure score = 47; risk adjusted = 51  22    Date Range for reporting period:  Beginnin22  Ending:      Progress report due (10 Rx/or 30 days whichever is less):      Recertification due (POC duration/ or 90 days whichever is less): 22     Visit # Insurance Allowable Auth required? Date Range   10 18 [x]  Yes  []  No 10/28/22 - 23     Pain level:  3-4/10 W/movement but at rest its generally ok       SUBJECTIVE:  pt. Reports that her shoulder is feeling about the same. She was extremely sore following previous therapy session and does not want to use the hypervolt again. Pt. Notes that she continues to use the massage cream at home. F/U with Dr. Jossy Moore . OBJECTIVE:   Observation:   Test measurements:    11/3/22: R shoulder PROM: flex ~ 90 deg, ER ~20 deg    AROM RT shoulder ~ 165  Abd ~120 TTP over the RT upper traps/ into pectoral major muscle groups    observed pt reaching overhead [employer]" with no hesitation entering clinic. RESTRICTIONS/PRECAUTIONS: R shoulder pain    Exercises/Interventions:   Therapeutic Ex 20' Wt / Resistance Sets/sec Reps Notes   Shrugs   1 15x    Scap retractions  1 15x    Table slides w/use of slideboard  5\" 15    Seated cane ER AAROM  5\" 15x    Supine cane press   Seated cane flexion   1  5\" 10  10x    Pec stretch gentle  10\" 5 Hands low   TB Rows Red 1 15x Performed in sitting due to LBP   TB Ext Red 1 15x Performed in sitting due to LBP   TB ER HOLD due to increased pain    Seated Cervical retractions  5\" 10    No monies yellow 1 10              Therapeutic Activities               HOLD                                                    Manual Intervention 20'    Pt elevated in supine w/2 pillows   Shld /GH Mobs       Post Cap mobs       Thoracic/Rib manipulation       STM 15'   R pec, UT, teres minor   PROM   RT. shoulder 5'      Manual cervical traction/ lateral flexion to left             NMR re-education                                                                 Pt. Education:  -pt educated on diagnosis, prognosis and expectations for rehab  -all pt questions were answered    Home Exercise Program:  [address] access code [email or web address]      Therapeutic Exercise and NMR EXR  [x] (88632) Provided verbal/tactile cueing for activities related to strengthening, flexibility, endurance, ROM  for improvements in scapular, scapulothoracic and UE control with self care, reaching, carrying, lifting, house/yardwork, driving/computer work.    [] (10967) Provided verbal/tactile cueing for activities related to improving balance, coordination, kinesthetic sense, posture, motor skill, proprioception  to assist with  scapular, scapulothoracic and UE control with self care, reaching, carrying, lifting, house/yardwork, driving/computer work.     Therapeutic Activities:    [] (75078 or 90379) Provided verbal/tactile cueing for activities related to improving balance, coordination, kinesthetic sense, posture, motor skill, proprioception and motor activation to allow for proper function of scapular, scapulothoracic and UE control with self care, carrying, lifting, driving/computer work. Home Exercise Program:    [x] (45735) Reviewed/Progressed HEP activities related to strengthening, flexibility, endurance, ROM of scapular, scapulothoracic and UE control with self care, reaching, carrying, lifting, house/yardwork, driving/computer work  [] (71854) Reviewed/Progressed HEP activities related to improving balance, coordination, kinesthetic sense, posture, motor skill, proprioception of scapular, scapulothoracic and UE control with self care, reaching, carrying, lifting, house/yardwork, driving/computer work      Manual Treatments:  PROM / STM / Oscillations-Mobs:  G-I, II, III, IV (PA's, Inf., Post.)  [x] (25589) Provided manual therapy to mobilize soft tissue/joints of cervical/CT, scapular GHJ and UE for the purpose of modulating pain, promoting relaxation,  increasing ROM, reducing/eliminating soft tissue swelling/inflammation/restriction, improving soft tissue extensibility and allowing for proper ROM for normal function with self care, reaching, carrying, lifting, house/yardwork, driving/computer work    Modalities:  declined    Charges:  Timed Code Treatment Minutes: 40   Total Treatment Minutes: 40       [] EVAL (LOW) 99813 (typically 20 minutes face-to-face)  [] EVAL (MOD) 61780 (typically 30 minutes face-to-face)  [] EVAL (HIGH) 55409 (typically 45 minutes face-to-face)  [] RE-EVAL     [x] LQ(42911) x  2   [] IONTO (11094)  [] NMR (85739) x     [] VASO (41647)  [x] Manual (51876) x  1   [] Other:  [] TA (68079)x     [] Mech Traction (85887)  [] ES(attended) (77756)     [] ES (un) (99546):      If BWC Please Indicate Time In/Out and Total Minutes  CPT Code Time in Time out Total Min   35495 2:05 2:25 20   99999 2:25 2:45 20               OTHER     CP                    GOALS:  Patient stated goal: \"to get back to work\"  [] Progressing: [] Met: [] Not Met: [] Adjusted     Therapist goals for Patient:   Short Term Goals: To be achieved in: 2 weeks  1. Independent in HEP and progression per patient tolerance, in order to prevent re-injury. [] Progressing: [] Met: [] Not Met: [] Adjusted  2. Patient will have a decrease in pain to facilitate improvement in movement, function, and ADLs as indicated by Functional Deficits. [] Progressing: [] Met: [] Not Met: [] Adjusted     Long Term Goals: To be achieved in: 4-6 weeks  1. Patient will reach FOTO predicted score of at least 57 to assist with reaching prior level of function with activities such as reaching overhead. [] Progressing: [] Met: [] Not Met: [] Adjusted  2. Patient will demonstrate increased AROM to [facility] for R shoulder to allow for proper joint functioning as indicated by patients Functional Deficits. [] Progressing: [] Met: [] Not Met: [] Adjusted  3. Patient will demonstrate an increase in Strength to 4/5 for R shoulder to allow for proper functional mobility as indicated by patients Functional Deficits. [] Progressing: [] Met: [] Not Met: [] Adjusted  4. Patient will return to reaching overhead and reaching behind her back without increased symptoms or restriction. [] Progressing: [] Met: [] Not Met: [] Adjusted  5. Patient will return to work full duty without restrictions. [] Progressing: [] Met: [] Not Met: [] Adjusted           ASSESSMENT: pt. Continues to report pain throughout therapy session and be limited due to low back pain. Held on hypervolt due to reports of significant soreness following previous therapy session. Pt. Continues to progress in shoulder passive and active assisted mobility. Cueing throughout with scapular exercises and cervical retraction for correct movement. Pt. Will continue to benefit from skilled therapy in order to restore functional movement of shoulder.      Treatment/Activity Tolerance:  [] Patient tolerated treatment well [] Patient limited by fatique  [x] Patient limited by pain  [] Patient limited by other medical complications  [] Other:     Overall Progression Towards Functional goals/ Treatment Progress Update:  [] Patient is progressing as expected towards functional goals listed. [] Progression is slowed due to complexities/Impairments listed. [] Progression has been slowed due to co-morbidities. [x] Plan just implemented, too soon to assess goals progression <30days   [] Goals require adjustment due to lack of progress  [] Patient is not progressing as expected and requires additional follow up with physician  [] Other    Return to Play: (if applicable)   []  Stage 1: Intro to Strength   []  Stage 2: Dynamic Strength and Intro to Plyometrics   []  Stage 3: Advanced Plyometrics and Intro to Throwing   []  Stage 4: Sport specific Training/Return to Sport     []  Ready to Return to Play, Meets All Above Stages   []  Not Ready for Return to Sports   Comments:      Prognosis for POC: [] Good [x] Fair  [] Poor    Patient requires continued skilled intervention: [x] Yes  [] No      PLAN: Decrease pain, improve shoulder AROM and strength. Advised to d/c AROM with RT shoulder abduction during the day and keep herself moving as able. [x] Continue per plan of care [] Alter current plan (see comments)  [] Plan of care initiated [] Hold pending MD visit [] Discharge    Electronically signed by: Breanne Aranda PT    Note: If patient does not return for scheduled/recommended follow up visits, this note will serve as a discharge from care along with the most recent update on progress.

## 2022-12-13 DIAGNOSIS — S46.911A STRAIN OF RIGHT SHOULDER, INITIAL ENCOUNTER: Primary | ICD-10-CM

## 2022-12-14 ENCOUNTER — APPOINTMENT (OUTPATIENT)
Dept: PHYSICAL THERAPY | Age: 57
End: 2022-12-14
Payer: COMMERCIAL

## 2022-12-15 ENCOUNTER — HOSPITAL ENCOUNTER (OUTPATIENT)
Dept: PHYSICAL THERAPY | Age: 57
Setting detail: THERAPIES SERIES
Discharge: HOME | End: 2022-12-15
Payer: COMMERCIAL

## 2022-12-15 PROCEDURE — 97140 MANUAL THERAPY 1/> REGIONS: CPT

## 2022-12-15 PROCEDURE — 97110 THERAPEUTIC EXERCISES: CPT

## 2022-12-15 NOTE — FLOWSHEET NOTE
[facility]    Physical Therapy Treatment Note/ Progress Report:     Date:  12/15/2022    Patient Name:  Nataly Salcedo    :  1965  MRN: [id number]  Medical Diagnosis:  Strain of unspecified muscle, fascia and tendon at shoulder and upper arm level, right arm, initial encounter [S46.615A]  Strain of muscle(s) and tendon(s) of the rotator cuff of right shoulder, initial encounter [S46.011A]  Treatment Diagnosis:   Chronic right shoulder pain  M25.511       G89.29           Insurance/Certification information:  PT Insurance Information: Workers Compensation - 18 visits approved 10/28/22 - 22  Physician Information:  Irma Johnson MD    Plan of care signed (Y/N): []  Yes [x]  No  Date sent:     Date of Patient follow up with Physician:      Progress Report: []  Yes  [x]  No     Functional Scale:           Date assessed:  FOTO physical FS primary measure score = 47; risk adjusted = 51  22    Date Range for reporting period:  Beginnin22  Ending:      Progress report due (10 Rx/or 30 days whichever is less):      Recertification due (POC duration/ or 90 days whichever is less): 22     Visit # Insurance Allowable Auth required? Date Range   11 18 [x]  Yes  []  No 10/28/22 - 23     Pain level:  3-4/10 W/movement but at rest its generally ok       SUBJECTIVE:  Pt 12' late for session. Pt reports neck is starting to feel a little better, but still sore from hypervolt. Pt is having a little bit of an easier time turning her head to the right though. Pt reports not using her R arm much at home because it gets very tired and sore. F/U with Dr. Maria Dolores Ragland .      OBJECTIVE:   Observation:   Test measurements:    11/3/22: R shoulder PROM: flex ~ 90 deg, ER ~20 deg    AROM RT shoulder ~ 165  Abd ~120 TTP over the RT upper traps/ into pectoral major muscle groups    observed pt reaching overhead [employer]" with no hesitation entering clinic. RESTRICTIONS/PRECAUTIONS: R shoulder pain    Exercises/Interventions:   Therapeutic Ex 20' Wt / Resistance Sets/sec Reps Notes   Shrugs   1 15x    Scap retractions  1 15x    Wall slides  1 10    Seated cane ER AAROM  5\" 15x    Supine cane press   Seated cane flexion   1  5\" 10  10x    Pec stretch gentle  10\" 5 Hands low   TB Rows Red 1 15x Performed in sitting due to LBP   TB Ext Red 1 15x Performed in sitting due to LBP   TB ER HOLD due to increased pain    Seated Cervical retractions  5\" 10    No monies yellow 1 10              Therapeutic Activities               HOLD                                                    Manual Intervention 20'    Pt elevated in supine w/2 pillows   Shld /GH Mobs       Post Cap mobs       Thoracic/Rib manipulation       STM 15'   R pec, UT, teres minor   PROM   RT. shoulder 5'      Manual cervical traction/ lateral flexion to left      Progress report NPV       NMR re-education                                                                 Pt. Education:  -pt educated on diagnosis, prognosis and expectations for rehab  -all pt questions were answered    Home Exercise Program:  [address] access code [email or web address]      Therapeutic Exercise and NMR EXR  [x] (73999) Provided verbal/tactile cueing for activities related to strengthening, flexibility, endurance, ROM  for improvements in scapular, scapulothoracic and UE control with self care, reaching, carrying, lifting, house/yardwork, driving/computer work.    [] (55752) Provided verbal/tactile cueing for activities related to improving balance, coordination, kinesthetic sense, posture, motor skill, proprioception  to assist with  scapular, scapulothoracic and UE control with self care, reaching, carrying, lifting, house/yardwork, driving/computer work.     Therapeutic Activities:    [] (18713 or 17669) Provided verbal/tactile cueing for activities related to improving balance, coordination, kinesthetic sense, posture, motor skill, proprioception and motor activation to allow for proper function of scapular, scapulothoracic and UE control with self care, carrying, lifting, driving/computer work. Home Exercise Program:    [x] (27357) Reviewed/Progressed HEP activities related to strengthening, flexibility, endurance, ROM of scapular, scapulothoracic and UE control with self care, reaching, carrying, lifting, house/yardwork, driving/computer work  [] (98009) Reviewed/Progressed HEP activities related to improving balance, coordination, kinesthetic sense, posture, motor skill, proprioception of scapular, scapulothoracic and UE control with self care, reaching, carrying, lifting, house/yardwork, driving/computer work      Manual Treatments:  PROM / STM / Oscillations-Mobs:  G-I, II, III, IV (PA's, Inf., Post.)  [x] (27365) Provided manual therapy to mobilize soft tissue/joints of cervical/CT, scapular GHJ and UE for the purpose of modulating pain, promoting relaxation,  increasing ROM, reducing/eliminating soft tissue swelling/inflammation/restriction, improving soft tissue extensibility and allowing for proper ROM for normal function with self care, reaching, carrying, lifting, house/yardwork, driving/computer work    Modalities:  declined    Charges:  Timed Code Treatment Minutes: 40   Total Treatment Minutes: 40       [] EVAL (LOW) 47696 (typically 20 minutes face-to-face)  [] EVAL (MOD) 78434 (typically 30 minutes face-to-face)  [] EVAL (HIGH) 66360 (typically 45 minutes face-to-face)  [] RE-EVAL     [x] ZM(45711) x  2   [] IONTO (72268)  [] NMR (92168) x     [] VASO (07334)  [x] Manual (90453) x  1   [] Other:  [] TA (67951)x     [] Mech Traction (92140)  [] ES(attended) (44950)     [] ES (un) (95651):      If BWC Please Indicate Time In/Out and Total Minutes  CPT Code Time in Time out Total Min   48302 2:12 2:32 20   08514 2:32 2:52 20               OTHER     CP                    GOALS:  Patient stated goal: \"to get back to work\"  [] Progressing: [] Met: [] Not Met: [] Adjusted     Therapist goals for Patient:   Short Term Goals: To be achieved in: 2 weeks  1. Independent in HEP and progression per patient tolerance, in order to prevent re-injury. [] Progressing: [] Met: [] Not Met: [] Adjusted  2. Patient will have a decrease in pain to facilitate improvement in movement, function, and ADLs as indicated by Functional Deficits. [] Progressing: [] Met: [] Not Met: [] Adjusted     Long Term Goals: To be achieved in: 4-6 weeks  1. Patient will reach FOTO predicted score of at least 57 to assist with reaching prior level of function with activities such as reaching overhead. [] Progressing: [] Met: [] Not Met: [] Adjusted  2. Patient will demonstrate increased AROM to [facility] [employer] for R shoulder to allow for proper joint functioning as indicated by patients Functional Deficits. [] Progressing: [] Met: [] Not Met: [] Adjusted  3. Patient will demonstrate an increase in Strength to 4/5 for R shoulder to allow for proper functional mobility as indicated by patients Functional Deficits. [] Progressing: [] Met: [] Not Met: [] Adjusted  4. Patient will return to reaching overhead and reaching behind her back without increased symptoms or restriction. [] Progressing: [] Met: [] Not Met: [] Adjusted  5. Patient will return to work full duty without restrictions. [] Progressing: [] Met: [] Not Met: [] Adjusted           ASSESSMENT: pt. Continues to report pain throughout therapy session and be limited due to low back pain. Added wall slides with fatigue noted and initial cues to decrease UT compensation. Pt requires significant verbal and tactile cues with no monies to decrease UT compensation and for appropriate periscapular activation, significant fatigue noted with frequent rest breaks required. Held on hypervolt due to reports of significant soreness following previous therapy session.  Pt. Continues to progress in shoulder passive and active assisted mobility. Cueing throughout with scapular exercises and cervical retraction for correct movement. Pt. Will continue to benefit from skilled therapy in order to restore functional movement of shoulder. Treatment/Activity Tolerance:  [] Patient tolerated treatment well [] Patient limited by fatique  [x] Patient limited by pain  [] Patient limited by other medical complications  [] Other:     Overall Progression Towards Functional goals/ Treatment Progress Update:  [] Patient is progressing as expected towards functional goals listed. [] Progression is slowed due to complexities/Impairments listed. [] Progression has been slowed due to co-morbidities. [x] Plan just implemented, too soon to assess goals progression <30days   [] Goals require adjustment due to lack of progress  [] Patient is not progressing as expected and requires additional follow up with physician  [] Other    Return to Play: (if applicable)   []  Stage 1: Intro to Strength   []  Stage 2: Dynamic Strength and Intro to Plyometrics   []  Stage 3: Advanced Plyometrics and Intro to Throwing   []  Stage 4: Sport specific Training/Return to Sport     []  Ready to Return to Play, Meets All Above Stages   []  Not Ready for Return to Sports   Comments:      Prognosis for POC: [] Good [x] Fair  [] Poor    Patient requires continued skilled intervention: [x] Yes  [] No      PLAN: Decrease pain, improve shoulder AROM and strength. [x] Continue per plan of care [] Alter current plan (see comments)  [] Plan of care initiated [] Hold pending MD visit [] Discharge    Electronically signed by: Randy Granados PT    Note: If patient does not return for scheduled/recommended follow up visits, this note will serve as a discharge from care along with the most recent update on progress.

## 2022-12-21 ENCOUNTER — HOSPITAL ENCOUNTER (OUTPATIENT)
Dept: PHYSICAL THERAPY | Age: 57
Setting detail: THERAPIES SERIES
Discharge: HOME | End: 2022-12-21
Payer: COMMERCIAL

## 2022-12-21 PROCEDURE — 97140 MANUAL THERAPY 1/> REGIONS: CPT

## 2022-12-21 PROCEDURE — 97110 THERAPEUTIC EXERCISES: CPT
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
## 2022-12-28 ENCOUNTER — HOSPITAL ENCOUNTER (OUTPATIENT)
Dept: PHYSICAL THERAPY | Age: 57
Setting detail: THERAPIES SERIES
Discharge: HOME | End: 2022-12-28
Payer: COMMERCIAL

## 2022-12-28 PROCEDURE — 97140 MANUAL THERAPY 1/> REGIONS: CPT | Performed by: PHYSICAL THERAPIST

## 2022-12-28 PROCEDURE — 97110 THERAPEUTIC EXERCISES: CPT | Performed by: PHYSICAL THERAPIST

## 2022-12-28 NOTE — FLOWSHEET NOTE
[facility]      Physical Therapy Treatment Note/ Progress Report:     Date:  2022    Patient Name:  Dwayne Huff    :  1965  MRN: [id number]  Medical Diagnosis:  Strain of unspecified muscle, fascia and tendon at shoulder and upper arm level, right arm, initial encounter [S46.973C]  Strain of muscle(s) and tendon(s) of the rotator cuff of right shoulder, initial encounter [S46.011A]  Treatment Diagnosis:   Chronic right shoulder pain  M25.511       G89.29           Insurance/Certification information:  PT Insurance Information: Workers Compensation - 18 visits approved 10/28/22 - 22  Physician Information:  Nathan Gonzalez MD    Plan of care signed (Y/N): []  Yes [x]  No  Date sent:     Date of Patient follow up with Physician:      Progress Report: []  Yes  [x]  No     Functional Scale:           Date assessed:  FOTO physical FS primary measure score = 47; risk adjusted = 51  22  FOTO =44          22    Date Range for reporting period:  Beginnin22  Endin22    Progress report due (10 Rx/or 30 days whichever is less): 32     Recertification due (POC duration/ or 90 days whichever is less): 23    Visit # Insurance Allowable Auth required? Date Range   13 18 [x]  Yes  []  No 10/28/22 - 23     Pain level:  3/10 R shoulder, 4-5/10 neck       SUBJECTIVE:  Pt. Reports that her shoulder is feeling a little better but she continues to have increased pain in her neck. Pt. Reports difficulty turning her head R or L. Occasional radiating pain into R hand. Shoulder is feeling better with reaching and light lifting close to the body but pain with reaching out. Difficulty driving due to shoulder. Pt. States that she has completed some exercises.       OBJECTIVE:   Observation:   Test measurements:    11/3/22: R shoulder PROM: flex ~ 90 deg, ER ~20 deg    AROM RT shoulder ~ 165  Abd ~120 TTP over the RT upper traps/ into pectoral major muscle groups  12/7  observed pt reaching overhead [facility]" with no hesitation entering clinic.   12/21/22:  Cervical AROM: flex= 30 *pn, ext = 15 *pn, SB = 10 to R *pn, 15 to L *pn  R shoulder AROM: flex = 100, abd = 90    RESTRICTIONS/PRECAUTIONS: R shoulder pain    Exercises/Interventions:   Therapeutic Ex 10' Wt / Resistance Sets/sec Reps Notes   Shrugs   1 15x    Scap retractions     Wall slides     Seated cane ER AAROM     Supine cane press   Seated cane flexion      Pec stretch gentle  10\" 5 Hands low   TB Rows Red 1 15x Performed in sitting due to LBP   TB Ext Red 1 15x Performed in sitting due to LBP   TB ER HOLD due to increased pain    Seated Cervical retractions  5\" 10    No monies yellow 1 10    3 finger cervical rotation  1 10                     Therapeutic Activities               HOLD                                                    Manual Intervention 20'    Pt elevated in supine w/2 pillows   Shld /GH Mobs       Post Cap mobs       Thoracic/Rib manipulation       STM 10'   R pec, UT, teres minor   PROM   RT. shoulder 6'      Manual cervical traction/ lateral flexion to left 4'             NMR re-education                                                                 Pt. Education:  -pt educated on diagnosis, prognosis and expectations for rehab  -all pt questions were answered    Home Exercise Program:  Elvira Kwon access code [email or web address]      Therapeutic Exercise and NMR EXR  [x] (70815) Provided verbal/tactile cueing for activities related to strengthening, flexibility, endurance, ROM  for improvements in scapular, scapulothoracic and UE control with self care, reaching, carrying, lifting, house/yardwork, driving/computer work.    [] (78152) Provided verbal/tactile cueing for activities related to improving balance, coordination, kinesthetic sense, posture, motor skill, proprioception  to assist with  scapular, scapulothoracic and UE control with self care, reaching, carrying, lifting, house/yardwork, driving/computer work. Therapeutic Activities:    [] (01050 or 18715) Provided verbal/tactile cueing for activities related to improving balance, coordination, kinesthetic sense, posture, motor skill, proprioception and motor activation to allow for proper function of scapular, scapulothoracic and UE control with self care, carrying, lifting, driving/computer work.      Home Exercise Program:    [x] (59474) Reviewed/Progressed HEP activities related to strengthening, flexibility, endurance, ROM of scapular, scapulothoracic and UE control with self care, reaching, carrying, lifting, house/yardwork, driving/computer work  [] (23309) Reviewed/Progressed HEP activities related to improving balance, coordination, kinesthetic sense, posture, motor skill, proprioception of scapular, scapulothoracic and UE control with self care, reaching, carrying, lifting, house/yardwork, driving/computer work      Manual Treatments:  PROM / STM / Oscillations-Mobs:  G-I, II, III, IV (PA's, Inf., Post.)  [x] (59004) Provided manual therapy to mobilize soft tissue/joints of cervical/CT, scapular GHJ and UE for the purpose of modulating pain, promoting relaxation,  increasing ROM, reducing/eliminating soft tissue swelling/inflammation/restriction, improving soft tissue extensibility and allowing for proper ROM for normal function with self care, reaching, carrying, lifting, house/yardwork, driving/computer work    Modalities:    Charges:  Timed Code Treatment Minutes: 30   Total Treatment Minutes: 30       [] EVAL (LOW) 12948 (typically 20 minutes face-to-face)  [] EVAL (MOD) 39904 (typically 30 minutes face-to-face)  [] EVAL (HIGH) 59561 (typically 45 minutes face-to-face)  [] RE-EVAL     [x] TF(56054) x  1   [] IONTO (09798)  [] NMR (28983) x     [] VASO (68155)  [x] Manual (32744) x  1   [] Other:  [] TA (41100)x     [] Mech Traction (32977)  [] ES(attended) (45576)     [] ES (un) (55889): If BWC Please Indicate Time In/Out and Total Minutes  CPT Code Time in Time out Total Min   76354 6042 7726 56   63273 0446 1215 10               OTHER     CP                    GOALS:  Patient stated goal: \"to get back to work\"  [] Progressing: [] Met: [x] Not Met: [] Adjusted     Therapist goals for Patient:   Short Term Goals: To be achieved in: 2 weeks  1. Independent in HEP and progression per patient tolerance, in order to prevent re-injury. [] Progressing: [] Met: [x] Not Met: [] Adjusted  2. Patient will have a decrease in pain to facilitate improvement in movement, function, and ADLs as indicated by Functional Deficits. [] Progressing: [] Met: [x] Not Met: [] Adjusted     Long Term Goals: To be achieved in: 4-6 weeks  1. Patient will reach FOTO predicted score of at least 57 to assist with reaching prior level of function with activities such as reaching overhead. [] Progressing: [] Met: [x] Not Met: [] Adjusted  2. Patient will demonstrate increased AROM to [facility] for R shoulder to allow for proper joint functioning as indicated by patients Functional Deficits. [] Progressing: [] Met: [x] Not Met: [] Adjusted  3. Patient will demonstrate an increase in Strength to 4/5 for R shoulder to allow for proper functional mobility as indicated by patients Functional Deficits. [] Progressing: [] Met: [x] Not Met: [] Adjusted  4. Patient will return to reaching overhead and reaching behind her back without increased symptoms or restriction. [] Progressing: [] Met: [x] Not Met: [] Adjusted  5. Patient will return to work full duty without restrictions. [] Progressing: [] Met: [x] Not Met: [] Adjusted           ASSESSMENT: Pt. Continues to report pain throughout therapy session and is limited by cervical and low back pain. Pt. Demonstrates functional PROM of R shoulder. Cueing with scapular exercises and reaching to reduce UT compensatory movement which is contributing to neck pain.  Limited in therapy session this date due to time constraints of patient. Pt. Will continue to benefit from skilled therapy in order to reduce pain and restore functional movement for eventual return to work. Treatment/Activity Tolerance:  [] Patient tolerated treatment well [] Patient limited by fatique  [x] Patient limited by pain  [] Patient limited by other medical complications  [] Other:     Overall Progression Towards Functional goals/ Treatment Progress Update:  [] Patient is progressing as expected towards functional goals listed. [] Progression is slowed due to complexities/Impairments listed. [] Progression has been slowed due to co-morbidities. [x] Plan just implemented, too soon to assess goals progression <30days   [] Goals require adjustment due to lack of progress  [] Patient is not progressing as expected and requires additional follow up with physician  [] Other    Return to Play: (if applicable)   []  Stage 1: Intro to Strength   []  Stage 2: Dynamic Strength and Intro to Plyometrics   []  Stage 3: Advanced Plyometrics and Intro to Throwing   []  Stage 4: Sport specific Training/Return to Sport     []  Ready to Return to Play, Meets All Above Stages   []  Not Ready for Return to Sports   Comments:      Prognosis for POC: [] Good [x] Fair  [] Poor    Patient requires continued skilled intervention: [x] Yes  [] No      PLAN: Decrease pain, improve shoulder AROM and strength. [x] Continue per plan of care [] Alter current plan (see comments)  [] Plan of care initiated [] Hold pending MD visit [] Discharge    Electronically signed by: Leonarda Guy, PT    Note: If patient does not return for scheduled/recommended follow up visits, this note will serve as a discharge from care along with the most recent update on progress.

## 2022-12-30 ENCOUNTER — HOSPITAL ENCOUNTER (OUTPATIENT)
Dept: PHYSICAL THERAPY | Age: 57
Setting detail: THERAPIES SERIES
Discharge: HOME | End: 2022-12-30
Payer: COMMERCIAL

## 2022-12-30 PROCEDURE — 97140 MANUAL THERAPY 1/> REGIONS: CPT | Performed by: PHYSICAL THERAPIST

## 2022-12-30 PROCEDURE — 97110 THERAPEUTIC EXERCISES: CPT | Performed by: PHYSICAL THERAPIST

## 2022-12-30 NOTE — FLOWSHEET NOTE
[facility]      Physical Therapy Treatment Note/ Progress Report:     Date:  2022    Patient Name:  Dwayne Huff    :  1965  MRN: [id number]  Medical Diagnosis:  Strain of unspecified muscle, fascia and tendon at shoulder and upper arm level, right arm, initial encounter [S46.918L]  Strain of muscle(s) and tendon(s) of the rotator cuff of right shoulder, initial encounter [S46.011A]  Treatment Diagnosis:   Chronic right shoulder pain  M25.511       G89.29           Insurance/Certification information:  PT Insurance Information: Workers Compensation - 18 visits approved 10/28/22 - 22  Physician Information:  Nathan Gonzalez MD    Plan of care signed (Y/N): []  Yes [x]  No  Date sent:     Date of Patient follow up with Physician:      Progress Report: []  Yes  [x]  No     Functional Scale:           Date assessed:  FOTO physical FS primary measure score = 47; risk adjusted = 51  22  FOTO =44          22    Date Range for reporting period:  Beginnin22  Endin22    Progress report due (10 Rx/or 30 days whichever is less):      Recertification due (POC duration/ or 90 days whichever is less): 23    Visit # Insurance Allowable Auth required? Date Range   14 18 [x]  Yes  []  No 10/28/22 - 23     Pain level:  3/10 R shoulder, 4-5/10 neck       SUBJECTIVE:  pt. States that she was very painful in her shoulder and neck yesterday. Today she is feeling a little better but she continues to have pain with driving and pulling motions.        OBJECTIVE:   Observation:   Test measurements:    11/3/22: R shoulder PROM: flex ~ 90 deg, ER ~20 deg    AROM RT shoulder ~ 165  Abd ~120 TTP over the RT upper traps/ into pectoral major muscle groups    observed pt reaching overhead [facility]" with no hesitation entering clinic.   22:  Cervical AROM: flex= 30 *pn, ext = 15 *pn, SB = 10 to R *pn, 15 to L *pn  R shoulder AROM: flex = 100, abd = 90    RESTRICTIONS/PRECAUTIONS: R shoulder pain    Exercises/Interventions:   Therapeutic Ex 22' Wt / Resistance Sets/sec Reps Notes   Shrugs   1 15x    Scap retractions  1 15x    Wall slides     Seated cane ER AAROM     Supine cane press   Seated cane flexion   1  10     Pec stretch gentle  Hands low   TB Rows Red 1 15x Performed in sitting due to LBP   TB Ext Red 1 15x Performed in sitting due to LBP   TB ER HOLD due to increased pain    Seated Cervical retractions  5\" 10    No monies yellow 1 10    3 finger cervical rotation  1 10    SL ER 0# 1 15    SL punch  1 10 assisted   Bicep curl 2# 1 15 Cueing for full range             Therapeutic Activities               HOLD                                                    Manual Intervention 18'    Pt elevated in supine w/2 pillows   Shld /GH Mobs       Post Cap mobs       Thoracic/Rib manipulation       STM 8'   R pec, UT, teres minor   PROM   RT. shoulder 6'      Manual cervical traction/ lateral flexion to left 4'             NMR re-education                                                                 Pt. Education:  -pt educated on diagnosis, prognosis and expectations for rehab  -all pt questions were answered    Home Exercise Program:  [employer] access code [email or web address]      Therapeutic Exercise and NMR EXR  [x] (89222) Provided verbal/tactile cueing for activities related to strengthening, flexibility, endurance, ROM  for improvements in scapular, scapulothoracic and UE control with self care, reaching, carrying, lifting, house/yardwork, driving/computer work.    [] (21953) Provided verbal/tactile cueing for activities related to improving balance, coordination, kinesthetic sense, posture, motor skill, proprioception  to assist with  scapular, scapulothoracic and UE control with self care, reaching, carrying, lifting, house/yardwork, driving/computer work.     Therapeutic Activities:    [] (92080 or 25323) Provided verbal/tactile cueing for activities related to improving balance, coordination, kinesthetic sense, posture, motor skill, proprioception and motor activation to allow for proper function of scapular, scapulothoracic and UE control with self care, carrying, lifting, driving/computer work. Home Exercise Program:    [x] (58299) Reviewed/Progressed HEP activities related to strengthening, flexibility, endurance, ROM of scapular, scapulothoracic and UE control with self care, reaching, carrying, lifting, house/yardwork, driving/computer work  [] (36083) Reviewed/Progressed HEP activities related to improving balance, coordination, kinesthetic sense, posture, motor skill, proprioception of scapular, scapulothoracic and UE control with self care, reaching, carrying, lifting, house/yardwork, driving/computer work      Manual Treatments:  PROM / STM / Oscillations-Mobs:  G-I, II, III, IV (PA's, Inf., Post.)  [x] (83570) Provided manual therapy to mobilize soft tissue/joints of cervical/CT, scapular GHJ and UE for the purpose of modulating pain, promoting relaxation,  increasing ROM, reducing/eliminating soft tissue swelling/inflammation/restriction, improving soft tissue extensibility and allowing for proper ROM for normal function with self care, reaching, carrying, lifting, house/yardwork, driving/computer work    Modalities:    Charges:  Timed Code Treatment Minutes: 40   Total Treatment Minutes: 40       [] EVAL (LOW) 74420 (typically 20 minutes face-to-face)  [] EVAL (MOD) 03655 (typically 30 minutes face-to-face)  [] EVAL (HIGH) 10976 (typically 45 minutes face-to-face)  [] RE-EVAL     [x] IZ(91900) x  2   [] IONTO (82085)  [] NMR (20314) x     [] VASO (52677)  [x] Manual (94436) x  1   [] Other:  [] TA (52885)x     [] Mech Traction (92999)  [] ES(attended) (14872)     [] ES (un) (95599):      If BWC Please Indicate Time In/Out and Total Minutes  CPT Code Time in Time out Total Min   91428 [phone]   19385 [phone]               OTHER     CP                    GOALS:  Patient stated goal: \"to get back to work\"  [] Progressing: [] Met: [x] Not Met: [] Adjusted     Therapist goals for Patient:   Short Term Goals: To be achieved in: 2 weeks  1. Independent in HEP and progression per patient tolerance, in order to prevent re-injury. [] Progressing: [] Met: [x] Not Met: [] Adjusted  2. Patient will have a decrease in pain to facilitate improvement in movement, function, and ADLs as indicated by Functional Deficits. [] Progressing: [] Met: [x] Not Met: [] Adjusted     Long Term Goals: To be achieved in: 4-6 weeks  1. Patient will reach FOTO predicted score of at least 57 to assist with reaching prior level of function with activities such as reaching overhead. [] Progressing: [] Met: [x] Not Met: [] Adjusted  2. Patient will demonstrate increased AROM to [facility] for R shoulder to allow for proper joint functioning as indicated by patients Functional Deficits. [] Progressing: [] Met: [x] Not Met: [] Adjusted  3. Patient will demonstrate an increase in Strength to 4/5 for R shoulder to allow for proper functional mobility as indicated by patients Functional Deficits. [] Progressing: [] Met: [x] Not Met: [] Adjusted  4. Patient will return to reaching overhead and reaching behind her back without increased symptoms or restriction. [] Progressing: [] Met: [x] Not Met: [] Adjusted  5. Patient will return to work full duty without restrictions. [] Progressing: [] Met: [x] Not Met: [] Adjusted           ASSESSMENT: Pt. Continues to be limited throughout therapy session due to pain. Able to progress to sidelying for a couple exercises, but required manual assist to maintain technique. Pt. Continues to report R sided neck pain. Cueing throughout for correct scapular movement. Pt. Requires continued progression of skilled therapy in order to restore functional movement of shoulder and decrease pain.        Treatment/Activity Tolerance:  [] Patient tolerated treatment well [] Patient limited by fatique  [x] Patient limited by pain  [] Patient limited by other medical complications  [] Other:     Overall Progression Towards Functional goals/ Treatment Progress Update:  [] Patient is progressing as expected towards functional goals listed. [] Progression is slowed due to complexities/Impairments listed. [] Progression has been slowed due to co-morbidities. [x] Plan just implemented, too soon to assess goals progression <30days   [] Goals require adjustment due to lack of progress  [] Patient is not progressing as expected and requires additional follow up with physician  [] Other    Return to Play: (if applicable)   []  Stage 1: Intro to Strength   []  Stage 2: Dynamic Strength and Intro to Plyometrics   []  Stage 3: Advanced Plyometrics and Intro to Throwing   []  Stage 4: Sport specific Training/Return to Sport     []  Ready to Return to Play, Meets All Above Stages   []  Not Ready for Return to Sports   Comments:      Prognosis for POC: [] Good [x] Fair  [] Poor    Patient requires continued skilled intervention: [x] Yes  [] No      PLAN: Decrease pain, improve shoulder AROM and strength. [x] Continue per plan of care [] Alter current plan (see comments)  [] Plan of care initiated [] Hold pending MD visit [] Discharge    Electronically signed by: Santosh Mancera PT    Note: If patient does not return for scheduled/recommended follow up visits, this note will serve as a discharge from care along with the most recent update on progress.

## 2023-01-04 ENCOUNTER — HOSPITAL ENCOUNTER (OUTPATIENT)
Dept: PHYSICAL THERAPY | Age: 58
Setting detail: THERAPIES SERIES
Discharge: HOME | End: 2023-01-04

## 2023-01-04 ENCOUNTER — OFFICE VISIT (OUTPATIENT)
Dept: ORTHOPEDIC SURGERY | Age: 58
End: 2023-01-04

## 2023-01-04 VITALS — WEIGHT: 259 LBS | HEIGHT: 63 IN | BODY MASS INDEX: 45.89 KG/M2

## 2023-01-04 DIAGNOSIS — S46.911A STRAIN OF RIGHT SHOULDER, INITIAL ENCOUNTER: ICD-10-CM

## 2023-01-04 DIAGNOSIS — M75.81 ROTATOR CUFF TENDINITIS, RIGHT: Primary | ICD-10-CM

## 2023-01-04 DIAGNOSIS — S46.011A TRAUMATIC INCOMPLETE TEAR OF RIGHT ROTATOR CUFF, INITIAL ENCOUNTER: ICD-10-CM

## 2023-01-04 RX ORDER — METHYLPREDNISOLONE ACETATE 40 MG/ML
80 INJECTION, SUSPENSION INTRA-ARTICULAR; INTRALESIONAL; INTRAMUSCULAR; SOFT TISSUE ONCE
Status: COMPLETED | OUTPATIENT
Start: 2023-01-04 | End: 2023-01-04

## 2023-01-04 RX ADMIN — METHYLPREDNISOLONE ACETATE 80 MG: 40 INJECTION, SUSPENSION INTRA-ARTICULAR; INTRALESIONAL; INTRAMUSCULAR; SOFT TISSUE at 17:50

## 2023-01-04 NOTE — LETTER
Physical Therapy Rehabilitation Referral    Patient Name:  Ash Smith      YOB: 1965    Diagnosis:    1. Rotator cuff tendinitis, right    2. Traumatic incomplete tear of right rotator cuff, initial encounter    3. Strain of right shoulder, initial encounter        Precautions:     [x] Evaluate and Treat    Post Op Instructions:  [] Continuous passive motion (CPM) [] Elbow ROM  [x] Exercise in plane of scapula  []  Strengthening     [x] Pulley and instruction   [x] Home exercise program (copy to patient)   [] Sling when arm at risk  [] Sling or brace at all times   [] AAROM: Forward elevation to  140            [] AAROM: External rotation  To  40    [] Isometric external rotator strengthening [] AAROM: internal rotation: up the back  [x] Isometric abductor strengthening  [] AAROM: Internal abduction   [] Isometric internal rotator strengthening [] AAROM: cross-body adduction             Stretching:     Strengthening:  [x] Four quadrant (FE, ER, IR, CBA)  [x] Rotator cuff (ER, IR, Abd)  [x] Forward Elevation    [x] External Rotators     [x] External Rotation    [x] Internal Rotators  [x] Internal Rotation: up/back   [x] Abductors     [x] Internal Rotation: supine in abduction  [x] Sleeper Stretch    [] Flexors  [x] Cross-body abduction    [] Extensors  [] Pendulum (FE, Abd/Add, cw/ccw)  [x] Scapular Stabilizers   [] Wall-walking (FE, Abd)        [x] Shoulder shrugs     [] Table slides (FE)                [x] Rhomboid pinch  [] Elbow (flex, ext, pron, sup)        [] Lat.  Pull downs     [] Medial epicondylitis program       [] Forward punch   [] Lateral epicondylitis program       [] Internal rotators     [] Progressive resistive exercises  [] Bench Press        [] Bench press plus  Activities:     [] Lateral pull-downs  [] Rowing     [] Progressive two-hand supine press  [] Stepper/Exercise bike   [] Biceps: curls/supination  [] Swimming  [] Water exercises    Modalities:     Return to Sport:  [x] Of Choice      [] Plyometrics  [] Ultrasound     [] Rhythmic stabilization  [] Iontophoresis    [] Core strengthening   [] Moist heat     [] Sports specific program:   [] Massage         [x] Cryotherapy      [] Electrical stimulation     [] Paraffin  [] Whirlpool  [] TENS    [x] Home exercise program (copy to patient). Perform exercises for:   15     minutes    3      times/day  [x] Supervised physical therapy  Frequency: []  1x week  [x] 2x week  [] 3x week  [] Other:   Duration: [] 2 weeks   [] 4 weeks  [x] 6 weeks  [] Other:     Additional Instructions:     Josafat Rodriguez MD, PhD

## 2023-01-04 NOTE — PROGRESS NOTES
[facility]  History and Physical  Shoulder Pain    Date:  2023    Name:  Caron Bryan  Address:  [address] [place] [address]    :  1965      Age:   56 y.o.    SSN:  [id number]      Medical Record Number:  [id number]    Reason for Visit:    Shoulder Pain (F/U RIGHT SHOULDER WC)      HPI:   Caron Bryan is a 56 y.o. female who presents to our office today for follow up of the right shoulder pain. This patient has been struggling with her right shoulder following a work-related injury that occurred on 2022. She had an MRI which showed a rotator cuff tendinopathy along with a partial rotator cuff tear along with AC joint arthropathy. Patient has been doing extensive formal physical therapy for several months. She reports she is not progressing as well as she had hoped. She continues to have a fair amount of pain with any movement of her shoulder. She does have some allergies that prohibit her from taking oral NSAIDs. [place] [facility] She denies any new injuries or setbacks since her last visit with us. Of note the patient has not returned back to work as of yet and would like to discuss that today      Pain Assessment  Location of Pain: Shoulder  Location Modifiers: Right  Severity of Pain: 7  Quality of Pain: Aching, Dull, Sharp, Throbbing  Duration of Pain: Persistent  Frequency of Pain: Constant  Aggravating Factors: Other (Comment), Straightening, Exercise, Stretching  Limiting Behavior: Yes  Relieving Factors: Rest, Exercise, Other (Comment), Heat  Result of Injury: Yes  Work-Related Injury: Yes  Are there other pain locations you wish to document?: No    No notes on file    Review of Systems:  A 14 point review of systems available in the scanned medical record as documented by the patient and reviewed on 2023.   The review is negative with the exception of those things mentioned in the History of Present Illness and Past Medical History. Past Medical History:  Patient's medications, allergies, past medical, surgical, social and family histories were reviewed and updated as appropriate. Allergies: Allergies   Allergen Reactions    Apple Itching     itching of skin ,lips,throat    Denis Hives     itching of skin ,lips,throat    Mangifera Indica Hives     itching of skin ,lips,throat  itching of skin ,lips,throat  itching of skin ,lips,throat    Strawberry Hives     itching of skin ,lips,throat       Physical Exam:  There were no vitals filed for this visit. General: Kelsey Mancuso is a healthy and well appearing 56 y.o. female who is sitting comfortably in our office in acute distress. Skin:  There are no skin lesions, cellulitis, or extreme edema. The patient has warm and well-perfused Bilateral upper extremities with brisk capillary refill. Eyes: Extra-ocular muscles intact  Mouth: Oral mucosa moist. No perioral lesions  Pulm: Respirations unlabored and regular. Neuro: Alert and oriented x3    Right shoulder Exam:  Inspection:  No gross deformities, no signs of infection. Palpation:  There is no crepitus. Tenderness to palpation over the rotator cuff footprint, AC joint, bicipital groove. Non-tender to palpation over the posterior joint line. Active Range of Motion: Forward elevation of 130, abduction of 130, external rotation with elbow at the side 35, internal rotation to the back is L1    Passive Range of Motion: deferred    Strength: External rotation with resistance with elbow at the side +4/5, internal rotation with resistance with elbow at the side +4/5, Champagne toast testing +4/5    Special Tests:   No Gavin muscle deformity.     Neurovascular: Sensation to light touch is intact, no motor deficits, palpable radial pulses 2+    Additional Examinations:    Examination of the contralateral extremity does not show any tenderness, deformity or injury. Range of motion is unremarkable. There is no gross instability. There are no rashes, ulcerations or lesions. Strength and tone are normal.      Radiographic:  X-ray not obtained  Assessment:  Marely Villaseñor is a 56 y.o. female with a history of a work-related injury currently with a right shoulder strain, AC joint arthropathy, rotator cuff tendinitis with partial tear. This is not a surgical problem. Impression:  Encounter Diagnoses   Name Primary? Rotator cuff tendinitis, right Yes    Traumatic incomplete tear of right rotator cuff, initial encounter     Strain of right shoulder, initial encounter        Office Procedures:  Orders Placed This Encounter   Procedures    [address] [place]     Referral Priority:   Routine     Referral Type:   Eval and Treat     Referral Reason:   Specialty Services Required     Requested Specialty:   Physical Therapist     Number of Visits Requested:   1    20610 - [place] DRAIN/INJECT LARGE JOINT/BURSA     After discussing the risks and benefits of a corticosteroid injection, Edyta Niru did state an understanding and gave verbal consent to proceed. After cleansing the injection site with Chlora-prep and using aseptic techniques,  2 CC of Depo Medrol 40mg/ml and 8 CC of 1% lidocaine were injected in the right glenohumeral and subacromial. She  tolerated the procedure well with no immediate adverse sequelae after the injection. A bandage was placed over the injection site. Appropriate post injections instructions were given to the patient. Plan: We recommend a corticosteroid injection today which she was agreeable to. We also recommend that she continue with therapy to regain full movement and strength. We will also release her back to work with restrictions. 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 Marely Villaseñor will follow up in 4 weeks and/or as needed.  She was in agreement with this plan and all questions were answered to the patient's satisfaction. She was encouraged to call with any questions. 1/4/2023  5:00 PM      Aisha Stevenson PA-C  Orthopaedic Sports Medicine Physician Assistant    During this examination, I, Aisha Stevenson PA-C, functioned as a scribe for Dr. Yohana Hurt. This dictation was performed with a verbal recognition program (DRAGON) and it was checked for errors. It is possible that there are still dictated errors within this office note. If so, please bring any errors to my attention for an addendum. All efforts were made to ensure that this office note is accurate.  ________________  I, Dr. Yohana Hurt, personally performed the services described in this documentation as described by Aisha Stevenson PA-C in my presence, and it is both accurate and complete. Josafat Casillas MD, PhD  1/4/2023

## 2023-01-04 NOTE — FLOWSHEET NOTE
[facility]    Physical Therapy  Cancellation/No-show Note  Patient Name:  Kia Lyons  :  1965   Date:  2023  Cancelled visits to date: 3  No-shows to date: 0    For today's appointment patient:  [x]  Cancelled  []  Rescheduled appointment  []  No-show     Reason given by patient:  []  Patient ill  []  Conflicting appointment  []  No transportation    []  Conflict with work  [x]  No reason given - \"can't make it\"  []  Other:     Comments:      Phone call information:   []  Phone call made today to patient at _ time at number provided:      []  Patient answered, conversation as follows:    []  Patient did not answer, message left as follows:  []  Phone call not made today  [x]  Phone call not needed - pt contacted us to cancel and provided reason for cancellation.      Electronically signed by:  Gena Hooks, PT, DPT

## 2023-01-05 ENCOUNTER — TELEPHONE (OUTPATIENT)
Dept: ORTHOPEDIC SURGERY | Age: 58
End: 2023-01-05

## 2023-01-05 NOTE — TELEPHONE ENCOUNTER
Other PATIENT NEEDING A LETTER STATING THAT SHE WILL NOT BE ABLE TO LIFT WEIGHT. HER SON HAS EPILEPSY AND IF HE HAS SEIZURES SHE WILL BE UNABLE TO TEND TO HIM. 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   PLS CALL TO ASSIST [phone]

## 2023-01-06 ENCOUNTER — TELEPHONE (OUTPATIENT)
Dept: ORTHOPEDIC SURGERY | Age: 58
End: 2023-01-06

## 2023-01-06 NOTE — TELEPHONE ENCOUNTER
FYI-  Jair Sánchez stating injured worker is asking for a third opinion regarding right shoulder with [employer].  WC will send a C9 for referral.

## 2023-01-19 ENCOUNTER — HOSPITAL ENCOUNTER (OUTPATIENT)
Dept: PHYSICAL THERAPY | Age: 58
Setting detail: THERAPIES SERIES
Discharge: HOME | End: 2023-01-19

## 2023-01-19 NOTE — FLOWSHEET NOTE
[facility]    Physical Therapy  Cancellation/No-show Note  Patient Name:  Lou French  :  1965   Date:  2023  Cancelled visits to date: 3  No-shows to date: 1    For today's appointment patient:  []  Cancelled  []  Rescheduled appointment  [x]  No-show     Reason given by patient:  []  Patient ill  []  Conflicting appointment  []  No transportation    []  Conflict with work  [x]  No reason given   []  Other:    Comments:      Phone call information:   [x]  Phone call made today to patient at _ time at number provided:      []  Patient answered, conversation as follows:    [x]  Patient did not answer, message left as follows: LVM about missed appt  []  Phone call not made today  []  Phone call not needed - pt contacted us to cancel and provided reason for cancellation.      Electronically signed by:  Phillip Mueller, PT, DPT ,

## 2023-01-31 ENCOUNTER — TELEPHONE (OUTPATIENT)
Dept: ORTHOPEDIC SURGERY | Age: 58
End: 2023-01-31

## 2023-01-31 NOTE — TELEPHONE ENCOUNTER
[id number] / WORK[employer]:    Caller:  Patient    Phone#:    Fax#:  please fax to Estefania Larsen [phone]    [employer] Date of Service:    11-  REASON FOR CALL:         [id number] Issues will not process for payment    [employer] 14 can not be signed by a PA, could Dr. Francisco Disla please sign
